# Patient Record
Sex: FEMALE | Race: BLACK OR AFRICAN AMERICAN | NOT HISPANIC OR LATINO | Employment: OTHER | URBAN - METROPOLITAN AREA
[De-identification: names, ages, dates, MRNs, and addresses within clinical notes are randomized per-mention and may not be internally consistent; named-entity substitution may affect disease eponyms.]

---

## 2017-04-11 ENCOUNTER — OFFICE VISIT (OUTPATIENT)
Dept: URGENT CARE | Facility: PHYSICIAN GROUP | Age: 82
End: 2017-04-11
Payer: COMMERCIAL

## 2017-04-11 VITALS
HEIGHT: 61 IN | RESPIRATION RATE: 16 BRPM | OXYGEN SATURATION: 92 % | DIASTOLIC BLOOD PRESSURE: 82 MMHG | TEMPERATURE: 99 F | HEART RATE: 80 BPM | SYSTOLIC BLOOD PRESSURE: 170 MMHG | WEIGHT: 91 LBS | BODY MASS INDEX: 17.18 KG/M2

## 2017-04-11 DIAGNOSIS — H60.501 ACUTE OTITIS EXTERNA OF RIGHT EAR, UNSPECIFIED TYPE: ICD-10-CM

## 2017-04-11 DIAGNOSIS — I10 EPISODE OF HYPERTENSION: ICD-10-CM

## 2017-04-11 PROCEDURE — 99214 OFFICE O/P EST MOD 30 MIN: CPT | Performed by: NURSE PRACTITIONER

## 2017-04-13 NOTE — PROGRESS NOTES
Chief Complaint   Patient presents with   • Otalgia     bilat ear pain x 1 wk       HISTORY OF PRESENT ILLNESS: Patient is a 87 y.o. female who presents today due to complaints of bilateral ear fullness for the past week and right ear pain today. She admits to a history of cerumen impaction. She attempted to irrigate her right ear out yesterday without success and now complains of right ear pain. Denies fever, chills, nausea, headache, injury or trauma. She denies history of hypertension but does admit to recent home stressors. She denies CP, SOB, diaphoresis, weakness, numbness or tingling.     Patient Active Problem List    Diagnosis Date Noted   • Increased urinary frequency 11/16/2016   • Fullness in ear 11/16/2016   • Mild intermittent asthma without complication 11/16/2016   • Need for prophylactic vaccination with Streptococcus pneumoniae (Pneumococcus) and Influenza vaccines 11/16/2016       Allergies:Review of patient's allergies indicates no known allergies.    Current Outpatient Prescriptions Ordered in [facility]   Medication Sig Dispense Refill   • ciprofloxacin (CIPRO HC OTIC) 0.2-1 % Suspension Place 3 Drops in right ear 2 times a day for 7 days. 1 Bottle 0   • omeprazole (PRILOSEC) 20 MG delayed-release capsule Take 20 mg by mouth every day.     • Ginkgo Biloba 40 MG Tab Take 60 mg by mouth.     • albuterol (PROAIR HFA) 108 (90 BASE) MCG/ACT Aero Soln inhalation aerosol Inhale 2 Puffs by mouth every 6 hours as needed for Shortness of Breath. 8.5 g 3     No current [facility]-ordered facility-administered medications on file.       Past Medical History   Diagnosis Date   • Asthma        Social History   Substance Use Topics   • Smoking status: Never Smoker    • Smokeless tobacco: Never Used   • Alcohol Use: No       No family status information on file.   History reviewed. No pertinent family history.    ROS:  Review of Systems   Constitutional: Negative for fever, chills, weight loss, malaise, and fatigue.  "  HENT: Positive for right ear pain and bilateral ear fullness. Negative for nosebleeds, congestion, sore throat and neck pain.    Eyes: Negative for vision changes.   Neuro: Negative for headache, sensory changes, weakness, seizure, LOC.   Cardiovascular: Negative for chest pain, palpitations, orthopnea and leg swelling.   Respiratory: Negative for cough, sputum production, shortness of breath and wheezing.   Gastrointestinal: Negative for abdominal pain, nausea, vomiting or diarrhea.   Musculoskeletal: Negative for falls, neck pain, back pain, joint pain, myalgias.   Skin: Negative for rash, diaphoresis.     Exam:  Blood pressure 170/82, pulse 80, temperature 37.2 °C (99 °F), resp. rate 16, height 1.549 m (5' 1\"), weight 41.277 kg (91 lb), SpO2 92 %.  General: well-nourished, well-developed female in NAD  Head: normocephalic, atraumatic  Eyes: PERRLA, no conjunctival injection, acuity grossly intact, lids normal.  Ears: normal shape and symmetry, no tenderness, no discharge. Left external canal is without any significant edema or erythema, there is cerumen impacted and am unable to visualize tympanic membrane. Right canal is edematous, erythematous, with serosanguineous fluid present.   Nose: symmetrical without tenderness, no discharge.  Mouth/Throat: reasonable hygiene, no erythema, exudates or tonsillar enlargement.  Neck: no masses, range of motion within normal limits, no tracheal deviation. No obvious thyroid enlargement.   Lymph: no cervical adenopathy. No supraclavicular adenopathy.   Neuro: alert and oriented. Cranial nerves 1-12 grossly intact. No sensory deficit.   Cardiovascular: regular rate and rhythm. No edema.  Pulmonary: no distress. Chest is symmetrical with respiration, no wheezes, crackles, or rhonchi.   Musculoskeletal: no clubbing, appropriate muscle tone, gait is stable.  Skin: warm, dry, intact, no clubbing, no cyanosis, no rashes.   Psych: appropriate mood, affect, judgement. "         Assessment/Plan:  1. Acute otitis externa of right ear, unspecified type  ciprofloxacin (CIPRO HC OTIC) 0.2-1 % Suspension   2. Episode of hypertension         I have offered to irrigate the patient's left ear, she is declining at this time, would like her right ear to heal and then will return for re-eval and possible irrigation. Cipro as directed. Keep ear clean and dry, no foreign liquid or items into canal.   Her BP today is elevated, she is given cardiac and stroke warning signs, encouraged to follow up with her PCP ASAP regarding.   Supportive care, differential diagnoses, and indications for immediate follow-up discussed with patient.   Pathogenesis of diagnosis discussed including typical length and natural progression.   Instructed to return to clinic or nearest emergency department for any change in condition, further concerns, or worsening of symptoms.  Patient states understanding of the plan of care and discharge instructions.          Please note that this dictation was created using voice recognition software. I have made every reasonable attempt to correct obvious errors, but I expect that there are errors of grammar and possibly content that I did not discover before finalizing the note.      KRISHNA Muñiz.

## 2017-06-11 ENCOUNTER — OFFICE VISIT (OUTPATIENT)
Dept: URGENT CARE | Facility: PHYSICIAN GROUP | Age: 82
End: 2017-06-11
Payer: COMMERCIAL

## 2017-06-11 VITALS
DIASTOLIC BLOOD PRESSURE: 80 MMHG | BODY MASS INDEX: 17.18 KG/M2 | OXYGEN SATURATION: 94 % | HEART RATE: 76 BPM | WEIGHT: 91 LBS | SYSTOLIC BLOOD PRESSURE: 140 MMHG | TEMPERATURE: 98.2 F | HEIGHT: 61 IN

## 2017-06-11 DIAGNOSIS — S81.811A LACERATION OF LEG, RIGHT, INITIAL ENCOUNTER: ICD-10-CM

## 2017-06-11 PROCEDURE — 12002 RPR S/N/AX/GEN/TRNK2.6-7.5CM: CPT | Performed by: FAMILY MEDICINE

## 2017-06-11 RX ORDER — SOLIFENACIN SUCCINATE 5 MG/1
5 TABLET, FILM COATED ORAL
Refills: 3 | COMMUNITY
Start: 2017-05-15

## 2017-06-11 NOTE — PATIENT INSTRUCTIONS
Laceration Care, Adult  A laceration is a cut that goes through all of the layers of the skin and into the tissue that is right under the skin. Some lacerations heal on their own. Others need to be closed with stitches (sutures), staples, skin adhesive strips, or skin glue. Proper laceration care minimizes the risk of infection and helps the laceration to heal better.  HOW TO CARE FOR YOUR LACERATION  If sutures or staples were used:  · Keep the wound clean and dry.  · If you were given a bandage (dressing), you should change it at least one time per day or as told by your health care provider. You should also change it if it becomes wet or dirty.  · Keep the wound completely dry for the first 24 hours or as told by your health care provider. After that time, you may shower or bathe. However, make sure that the wound is not soaked in water until after the sutures or staples have been removed.  · Clean the wound one time each day or as told by your health care provider:  ¨ Wash the wound with soap and water.  ¨ Rinse the wound with water to remove all soap.  ¨ Pat the wound dry with a clean towel. Do not rub the wound.  · After cleaning the wound, apply a thin layer of antibiotic ointment as told by your health care provider. This will help to prevent infection and keep the dressing from sticking to the wound.  · Have the sutures or staples removed as told by your health care provider.  If skin adhesive strips were used:  · Keep the wound clean and dry.  · If you were given a bandage (dressing), you should change it at least one time per day or as told by your health care provider. You should also change it if it becomes dirty or wet.  · Do not get the skin adhesive strips wet. You may shower or bathe, but be careful to keep the wound dry.  · If the wound gets wet, pat it dry with a clean towel. Do not rub the wound.  · Skin adhesive strips fall off on their own. You may trim the strips as the wound heals. Do not  remove skin adhesive strips that are still stuck to the wound. They will fall off in time.  If skin glue was used:  · Try to keep the wound dry, but you may briefly wet it in the shower or bath. Do not soak the wound in water, such as by swimming.  · After you have showered or bathed, gently pat the wound dry with a clean towel. Do not rub the wound.  · Do not do any activities that will make you sweat heavily until the skin glue has fallen off on its own.  · Do not apply liquid, cream, or ointment medicine to the wound while the skin glue is in place. Using those may loosen the film before the wound has healed.  · If you were given a bandage (dressing), you should change it at least one time per day or as told by your health care provider. You should also change it if it becomes dirty or wet.  · If a dressing is placed over the wound, be careful not to apply tape directly over the skin glue. Doing that may cause the glue to be pulled off before the wound has healed.  · Do not pick at the glue. The skin glue usually remains in place for 5-10 days, then it falls off of the skin.  General Instructions  · Take over-the-counter and prescription medicines only as told by your health care provider.  · If you were prescribed an antibiotic medicine or ointment, take or apply it as told by your doctor. Do not stop using it even if your condition improves.  · To help prevent scarring, make sure to cover your wound with sunscreen whenever you are outside after stitches are removed, after adhesive strips are removed, or when glue remains in place and the wound is healed. Make sure to wear a sunscreen of at least 30 SPF.  · Do not scratch or pick at the wound.  · Keep all follow-up visits as told by your health care provider. This is important.  · Check your wound every day for signs of infection. Watch for:  ¨ Redness, swelling, or pain.  ¨ Fluid, blood, or pus.  · Raise (elevate) the injured area above the level of your heart  while you are sitting or lying down, if possible.  SEEK MEDICAL CARE IF:  · You received a tetanus shot and you have swelling, severe pain, redness, or bleeding at the injection site.  · You have a fever.  · A wound that was closed breaks open.  · You notice a bad smell coming from your wound or your dressing.  · You notice something coming out of the wound, such as wood or glass.  · Your pain is not controlled with medicine.  · You have increased redness, swelling, or pain at the site of your wound.  · You have fluid, blood, or pus coming from your wound.  · You notice a change in the color of your skin near your wound.  · You need to change the dressing frequently due to fluid, blood, or pus draining from the wound.  · You develop a new rash.  · You develop numbness around the wound.  SEEK IMMEDIATE MEDICAL CARE IF:  · You develop severe swelling around the wound.  · Your pain suddenly increases and is severe.  · You develop painful lumps near the wound or on skin that is anywhere on your body.  · You have a red streak going away from your wound.  · The wound is on your hand or foot and you cannot properly move a finger or toe.  · The wound is on your hand or foot and you notice that your fingers or toes look pale or bluish.     This information is not intended to replace advice given to you by your health care provider. Make sure you discuss any questions you have with your health care provider.     Document Released: 12/18/2006 Document Revised: 05/03/2016 Document Reviewed: 12/14/2015  [employer] Patient Education ©2016 [employer]

## 2017-06-11 NOTE — PROGRESS NOTES
"Subjective:      Summer Franco is a 87 y.o. female who presents with Laceration            Laceration   The incident occurred 1 to 3 hours ago. The laceration is located on the right leg. The laceration is 5 cm in size. The laceration mechanism was a blunt object. The pain is mild. She reports no foreign bodies present.       ROS       Objective:     /80 mmHg  Pulse 76  Temp(Src) 36.8 °C (98.2 °F)  Ht 1.549 m (5' 1\")  Wt 41.277 kg (91 lb)  BMI 17.20 kg/m2  SpO2 94%     Physical Exam   Constitutional: She is oriented to person, place, and time. She appears well-developed and well-nourished. No distress.   Eyes: EOM are normal. Pupils are equal, round, and reactive to light.   Cardiovascular: Normal rate, regular rhythm, normal heart sounds and intact distal pulses.    Pulmonary/Chest: Effort normal and breath sounds normal. No respiratory distress.   Abdominal: Soft. Bowel sounds are normal. She exhibits no distension.   Musculoskeletal: Normal range of motion.   Neurological: She is alert and oriented to person, place, and time. She has normal reflexes.   Skin: Skin is warm and dry.        Psychiatric: She has a normal mood and affect. Her behavior is normal.               Assessment/Plan:     1. Laceration of leg, right, initial encounter  PROCEDURE:     The wound area was irrigated with sterile saline and draped in a sterile   fashion.  The wound area was anesthetized with Lidocaine 1% without epinephrine. Once good anaesthesia is achieved, betadine is applied to area .     The wound was repaired with 4-0 Prolene   using simple interrupted stitches and a sterile dressing applied.     Patient to return for suture removal  5-7 days facial  7-10 days all other wounds.    Patient discharged without any immediate complications.  Wound care instructions provided.  OTC analgesics prn  Keep elevated/ice as needed  Worsening/infection precautions given.  Patient states understands agrees with treatment plan " and follow up.

## 2017-06-21 ENCOUNTER — OFFICE VISIT (OUTPATIENT)
Dept: URGENT CARE | Facility: PHYSICIAN GROUP | Age: 82
End: 2017-06-21
Payer: COMMERCIAL

## 2017-06-21 VITALS
OXYGEN SATURATION: 93 % | BODY MASS INDEX: 17.18 KG/M2 | TEMPERATURE: 98.8 F | SYSTOLIC BLOOD PRESSURE: 144 MMHG | WEIGHT: 91 LBS | HEIGHT: 61 IN | HEART RATE: 81 BPM | DIASTOLIC BLOOD PRESSURE: 68 MMHG

## 2017-06-21 DIAGNOSIS — Z48.02 VISIT FOR SUTURE REMOVAL: ICD-10-CM

## 2017-06-21 DIAGNOSIS — L08.9 SOFT TISSUE INFECTION: ICD-10-CM

## 2017-06-21 PROCEDURE — 99203 OFFICE O/P NEW LOW 30 MIN: CPT | Performed by: NURSE PRACTITIONER

## 2017-06-21 RX ORDER — CLINDAMYCIN HYDROCHLORIDE 300 MG/1
300 CAPSULE ORAL 3 TIMES DAILY
Qty: 21 CAP | Refills: 0 | Status: SHIPPED | OUTPATIENT
Start: 2017-06-21 | End: 2017-06-28

## 2017-06-21 NOTE — PROGRESS NOTES
"Subjective:      Summer Franco is a 87 y.o. female who presents with Suture / Staple Removal            Suture / Staple Removal  Treated in ED: 10 days ago. She did not remove the initial bandage placed on day of procedure. She tried nothing since the wound repair. The treatment provided moderate relief. Wound drainage status: When the bandage was removed in the UC she had thick, cloudy bloody drainage from laceration site. The redness has improved. The swelling has improved. The pain has new pain.       Review of Systems   Skin:        Right lower leg lac repair   All other systems reviewed and are negative.    Past Medical History   Diagnosis Date   • Asthma       Past Surgical History   Procedure Laterality Date   • Abdominal hysterectomy total        Social History     Social History   • Marital Status:      Spouse Name: N/A   • Number of Children: N/A   • Years of Education: N/A     Occupational History   • Not on file.     Social History Main Topics   • Smoking status: Never Smoker    • Smokeless tobacco: Never Used   • Alcohol Use: No   • Drug Use: No   • Sexual Activity: Not on file     Other Topics Concern   • Not on file     Social History Narrative          Objective:     /68 mmHg  Pulse 81  Temp(Src) 37.1 °C (98.8 °F)  Ht 1.549 m (5' 1\")  Wt 41.277 kg (91 lb)  BMI 17.20 kg/m2  SpO2 93%     Physical Exam   Constitutional: She is oriented to person, place, and time. Vital signs are normal. She appears well-developed and well-nourished.   HENT:   Head: Normocephalic and atraumatic.   Eyes: EOM are normal. Pupils are equal, round, and reactive to light.   Neck: Normal range of motion.   Cardiovascular: Normal rate and regular rhythm.    Pulmonary/Chest: Effort normal.   Musculoskeletal: Normal range of motion.        Right lower leg: She exhibits tenderness and laceration.        Legs:  Neurological: She is alert and oriented to person, place, and time.   Skin: Skin is warm and dry. "   Psychiatric: She has a normal mood and affect. Her speech is normal and behavior is normal. Thought content normal.   Vitals reviewed.              Assessment/Plan:     1. Visit for suture removal    2. Soft tissue infection  - clindamycin (CLEOCIN) 300 MG Cap; Take 1 Cap by mouth 3 times a day for 7 days.  Dispense: 21 Cap; Refill: 0    6 sutures removed  Steri strips applied for reinforcement  Telfa bandage secured with paper tape  Advised pt to change dressing daily and leave wound open to air as much as possible, she V/U  Instructed to return to  or nearest emergency department if symptoms fail to improve, for any change in condition, further concerns, or new concerning symptoms.  Patient states understanding of the plan of care and discharge instructions.

## 2017-07-03 ENCOUNTER — HOSPITAL ENCOUNTER (OUTPATIENT)
Facility: MEDICAL CENTER | Age: 82
End: 2017-07-03
Attending: PHYSICIAN ASSISTANT
Payer: COMMERCIAL

## 2017-07-03 ENCOUNTER — OFFICE VISIT (OUTPATIENT)
Dept: URGENT CARE | Facility: PHYSICIAN GROUP | Age: 82
End: 2017-07-03
Payer: COMMERCIAL

## 2017-07-03 VITALS
SYSTOLIC BLOOD PRESSURE: 136 MMHG | BODY MASS INDEX: 17.2 KG/M2 | TEMPERATURE: 98.9 F | HEART RATE: 70 BPM | OXYGEN SATURATION: 93 % | WEIGHT: 91 LBS | DIASTOLIC BLOOD PRESSURE: 78 MMHG

## 2017-07-03 DIAGNOSIS — L03.115 CELLULITIS OF RIGHT LOWER EXTREMITY: ICD-10-CM

## 2017-07-03 DIAGNOSIS — L03.90 WOUND CELLULITIS: ICD-10-CM

## 2017-07-03 PROCEDURE — 87205 SMEAR GRAM STAIN: CPT

## 2017-07-03 PROCEDURE — 99214 OFFICE O/P EST MOD 30 MIN: CPT | Performed by: PHYSICIAN ASSISTANT

## 2017-07-03 PROCEDURE — 87070 CULTURE OTHR SPECIMN AEROBIC: CPT

## 2017-07-03 PROCEDURE — 87075 CULTR BACTERIA EXCEPT BLOOD: CPT

## 2017-07-03 RX ORDER — CEPHALEXIN 500 MG/1
500 CAPSULE ORAL 3 TIMES DAILY
Qty: 30 CAP | Refills: 0 | Status: SHIPPED | OUTPATIENT
Start: 2017-07-03 | End: 2017-07-13

## 2017-07-03 RX ORDER — CEFTRIAXONE 1 G/1
1 INJECTION, POWDER, FOR SOLUTION INTRAMUSCULAR; INTRAVENOUS ONCE
Status: COMPLETED | OUTPATIENT
Start: 2017-07-03 | End: 2017-07-03

## 2017-07-03 RX ADMIN — CEFTRIAXONE 1 G: 1 INJECTION, POWDER, FOR SOLUTION INTRAMUSCULAR; INTRAVENOUS at 17:18

## 2017-07-03 ASSESSMENT — ENCOUNTER SYMPTOMS
DIZZINESS: 0
EYE REDNESS: 0
MYALGIAS: 0
SORE THROAT: 0
DIARRHEA: 0
NECK PAIN: 0
ABDOMINAL PAIN: 0
NUMBNESS: 0
CHILLS: 0
VOMITING: 0
TINGLING: 0
FATIGUE: 0
COUGH: 0
WHEEZING: 0
FALLS: 1
VISUAL CHANGE: 0
EYE DISCHARGE: 0
HEADACHES: 0
FEVER: 0

## 2017-07-04 LAB
GRAM STN SPEC: NORMAL
SIGNIFICANT IND 70042: NORMAL
SITE SITE: NORMAL
SOURCE SOURCE: NORMAL

## 2017-07-04 NOTE — PROGRESS NOTES
Subjective:      Summer Franco is a 87 y.o. female who presents with Wound Infection          Pt is 88 y/o female who presents with recurrent wound infection since falling requiring wound repair with 6 sutures in the ER. Pt. Came to . 2 prior visits for same wound- was recently started on Clindamycin of which she reports that she took and is uncertain if this helped. She admits that she has been changing her dressing everyday and then started to have redness around the wound then the wound started to get a little worse. She reports swelling and pain near the wound as well- this started last week. She denies any calf tenderness or increase warmth or pain.   Of note patient denies any renal insufficiency nor DMII.   Wound Infection  This is a recurrent problem. Episode onset: 6/11/17. The problem occurs constantly. Pertinent negatives include no abdominal pain, chest pain, chills, congestion, coughing, fatigue, fever, headaches, myalgias, neck pain, numbness, rash, sore throat, visual change or vomiting. Nothing aggravates the symptoms. Treatments tried: Clindamycin.       Review of Systems   Constitutional: Negative for fever, chills, malaise/fatigue and fatigue.   HENT: Negative for congestion and sore throat.    Eyes: Negative for discharge and redness.   Respiratory: Negative for cough and wheezing.    Cardiovascular: Positive for leg swelling. Negative for chest pain.   Gastrointestinal: Negative for vomiting, abdominal pain and diarrhea.   Musculoskeletal: Positive for falls. Negative for myalgias and neck pain.   Skin: Negative for itching and rash.   Neurological: Negative for dizziness, tingling, numbness and headaches.          Objective:     /78 mmHg  Pulse 70  Temp(Src) 37.2 °C (98.9 °F)  Wt 41.277 kg (91 lb)  SpO2 93%   PMH:  has a past medical history of Asthma.  MEDS:   Current outpatient prescriptions:   •  cephALEXin (KEFLEX) 500 MG Cap, Take 1 Cap by mouth 3 times a day for 10 days.,  Disp: 30 Cap, Rfl: 0  •  VESICARE 5 MG tablet, Take 5 mg by mouth every day., Disp: , Rfl: 3  •  Multiple Vitamin (MULTI VITAMIN PO), Take  by mouth., Disp: , Rfl:   •  omeprazole (PRILOSEC) 20 MG delayed-release capsule, Take 20 mg by mouth every day., Disp: , Rfl:   •  Ginkgo Biloba 40 MG Tab, Take 60 mg by mouth., Disp: , Rfl:   •  albuterol (PROAIR HFA) 108 (90 BASE) MCG/ACT Aero Soln inhalation aerosol, Inhale 2 Puffs by mouth every 6 hours as needed for Shortness of Breath., Disp: 8.5 g, Rfl: 3  ALLERGIES: No Known Allergies  SURGHX:   Past Surgical History   Procedure Laterality Date   • Abdominal hysterectomy total       SOCHX:  reports that she has never smoked. She has never used smokeless tobacco. She reports that she does not drink alcohol or use illicit drugs.  FH: Family history was reviewed, no pertinent findings to report    Physical Exam   Constitutional: She is oriented to person, place, and time. She appears well-developed and well-nourished.   HENT:   Head: Normocephalic and atraumatic.   Nose: Nose normal.   Eyes: EOM are normal. Pupils are equal, round, and reactive to light.   Neck: Normal range of motion. Neck supple.   Cardiovascular: Normal rate and regular rhythm.    Pulmonary/Chest: Effort normal. No respiratory distress.   Musculoskeletal: She exhibits edema. She exhibits no tenderness.   Right lower extremity- anterior portion of leg- open partial thickness wound- approx. 4.5 cm- minimal drainage- clear- this was cultured with surrounding erythema and edema- tenderness with deep palpation. Area of erythema was with increased warmth. This was marked. Neg. Calf tenderness, neg. Cords, and neg. Homans. Without current abscess formation.    Neurological: She is alert and oriented to person, place, and time. Coordination normal.   Skin: No rash noted. There is erythema. No pallor.   As above.    Psychiatric: She has a normal mood and affect. Her behavior is normal.   Vitals reviewed.               Assessment/Plan:     1. Cellulitis of right lower extremity  - cefTRIAXone (ROCEPHIN) injection 1 g; 1,000 mg by Intramuscular route Once.  - ANAEROBIC/AEROBIC/GRAM STAIN  - REFERRAL TO WOUND CLINIC    2. Wound cellulitis  - cefTRIAXone (ROCEPHIN) injection 1 g; 1,000 mg by Intramuscular route Once.  - ANAEROBIC/AEROBIC/GRAM STAIN  - REFERRAL TO WOUND CLINIC  - cephALEXin (KEFLEX) 500 MG Cap; Take 1 Cap by mouth 3 times a day for 10 days.  Dispense: 30 Cap; Refill: 0    At this time patient denies any renal insufficiency. Will change ABX if needed based on culture. Referral to wound care was also made- however RTC in 2 days for recheck- and if improving will then defer to wound care for further management. PT and her  both understand the plan. PT. Is to keep the wound covered if drainage and elevated. Other wound care discussed.   Patient given precautionary s/sx that mandate immediate follow up and evaluation in the ED. Advised of risks of not doing so.    DDX, Supportive care, and indications for immediate follow-up discussed with patient.    Instructed to return to clinic or nearest emergency department if we are not available for any change in condition, further concerns, or worsening of symptoms.    The patient demonstrated a good understanding and agreed with the treatment plan.

## 2017-07-05 ENCOUNTER — OFFICE VISIT (OUTPATIENT)
Dept: URGENT CARE | Facility: PHYSICIAN GROUP | Age: 82
End: 2017-07-05
Payer: COMMERCIAL

## 2017-07-05 VITALS
DIASTOLIC BLOOD PRESSURE: 72 MMHG | TEMPERATURE: 98.1 F | HEIGHT: 61 IN | WEIGHT: 91 LBS | HEART RATE: 79 BPM | BODY MASS INDEX: 17.18 KG/M2 | SYSTOLIC BLOOD PRESSURE: 138 MMHG | OXYGEN SATURATION: 94 %

## 2017-07-05 DIAGNOSIS — L03.90 WOUND CELLULITIS: ICD-10-CM

## 2017-07-05 PROCEDURE — 99024 POSTOP FOLLOW-UP VISIT: CPT | Performed by: NURSE PRACTITIONER

## 2017-07-05 ASSESSMENT — ENCOUNTER SYMPTOMS: FEVER: 0

## 2017-07-05 NOTE — PROGRESS NOTES
"Subjective:      Summer Franco is a 87 y.o. female who presents with Wound Check            Wound Check  Treated in ED: she was seen 2 days ago for a persistant right lower leg infection. She was started on new regimen of ABX but she reports she just picked up the Rx today and has not started it yet. Wound continues to drain onto her dressing. Previous treatment included IV/IM antibiotics and oral antibiotics. Maximum temperature: denies any fever or chills. There has been colored discharge from the wound. The redness has not changed. The swelling has not changed. The pain has not changed. Difficulty Moving Extremity/Digit: Summer reports the right leg is very tender when touched. She has not changed her dressing in two days.       Review of Systems   Constitutional: Negative for fever.   Skin:        RLE wound redness and drainage    All other systems reviewed and are negative.    Past Medical History   Diagnosis Date   • Asthma       Past Surgical History   Procedure Laterality Date   • Abdominal hysterectomy total        Social History     Social History   • Marital Status:      Spouse Name: N/A   • Number of Children: N/A   • Years of Education: N/A     Occupational History   • Not on file.     Social History Main Topics   • Smoking status: Never Smoker    • Smokeless tobacco: Never Used   • Alcohol Use: No   • Drug Use: No   • Sexual Activity: Not on file     Other Topics Concern   • Not on file     Social History Narrative          Objective:     /72 mmHg  Pulse 79  Temp(Src) 36.7 °C (98.1 °F)  Ht 1.549 m (5' 1\")  Wt 41.277 kg (91 lb)  BMI 17.20 kg/m2  SpO2 94%     Physical Exam   Constitutional: She is oriented to person, place, and time. Vital signs are normal. She appears well-developed and well-nourished.   HENT:   Head: Normocephalic and atraumatic.   Eyes: EOM are normal. Pupils are equal, round, and reactive to light.   Neck: Normal range of motion.   Cardiovascular: Normal rate " and regular rhythm.    Pulmonary/Chest: Effort normal.   Musculoskeletal:        Legs:  Neurological: She is alert and oriented to person, place, and time.   Skin: Skin is warm and dry.   Psychiatric: She has a normal mood and affect. Her speech is normal and behavior is normal. Thought content normal.   Vitals reviewed.              Assessment/Plan:     1. Wound cellulitis  Surrounding erythema does not extend beyond the borders of the marker outlined by previous provider. Border reinforced today.   Telfa gauze applied and secured with ace bandage  Instructed pt to change bandage at least once daily  Start Keflex today  RTC in 2 days for wound check  Referral was placed for wound clinic 2 days ago. Referral approved, informed pt that she should be hearing from the referrals dept shortly to make an appt.  Strict ER precautions for worsening condition  Instructed to return to  or nearest emergency department if symptoms fail to improve, for any change in condition, further concerns, or new concerning symptoms.  Patient states understanding of the plan of care and discharge instructions.

## 2017-07-05 NOTE — MR AVS SNAPSHOT
"Summer Franco   2017 12:40 PM   Office Visit   MRN: [id number]    Department:  [facility]   Dept Phone:  [phone]    Description:  Female : 1930   Provider:  CHARMAINE La           Reason for Visit     Wound Check right lower leg       Allergies as of 2017     No Known Allergies      You were diagnosed with     Wound cellulitis   [176178]         Vital Signs     Blood Pressure Pulse Temperature Height Weight Body Mass Index    138/72 mmHg 79 36.7 °C (98.1 °F) 1.549 m (5' 1\") 41.277 kg (91 lb) 17.20 kg/m2    Oxygen Saturation Smoking Status                94% Never Smoker           Basic Information     Date Of Birth Sex Race Ethnicity Preferred Language    1930 Female Black or  Non- English      Problem List              ICD-10-CM Priority Class Noted - Resolved    Increased urinary frequency R35.0   2016 - Present    Fullness in ear H93.8X9   2016 - Present    Mild intermittent asthma without complication J45.20   2016 - Present    Need for prophylactic vaccination with Streptococcus pneumoniae (Pneumococcus) and Influenza vaccines Z23   2016 - Present      Health Maintenance        Date Due Completion Dates    IMM DTaP/Tdap/Td Vaccine (1 - Tdap) 1949 ---    IMM ZOSTER VACCINE 1990 ---    BONE DENSITY 1995 ---    IMM INFLUENZA (1) 2017    IMM PNEUMOCOCCAL 65+ (ADULT) LOW/MEDIUM RISK SERIES (2 of 2 - PPSV23) 2017            Current Immunizations     13-VALENT PCV PREVNAR 2016    Influenza Vaccine Adult HD 2016      Below and/or attached are the medications your provider expects you to take. Review all of your home medications and newly ordered medications with your provider and/or pharmacist. Follow medication instructions as directed by your provider and/or pharmacist. Please keep your medication list with you and share with your provider. Update the information " when medications are discontinued, doses are changed, or new medications (including over-the-counter products) are added; and carry medication information at all times in the event of emergency situations     Allergies:  No Known Allergies          Medications  Valid as of: July 05, 2017 -  5:47 PM    Generic Name Brand Name Tablet Size Instructions for use    Albuterol Sulfate (Aero Soln) albuterol 108 (90 BASE) MCG/ACT Inhale 2 Puffs by mouth every 6 hours as needed for Shortness of Breath.        Cephalexin (Cap) KEFLEX 500 MG Take 1 Cap by mouth 3 times a day for 10 days.        Ginkgo Biloba (Tab) Ginkgo Biloba 40 MG Take 60 mg by mouth.        Multiple Vitamin   Take  by mouth.        Omeprazole (CAPSULE DELAYED RELEASE) PRILOSEC 20 MG Take 20 mg by mouth every day.        Solifenacin Succinate (Tab) VESICARE 5 MG Take 5 mg by mouth every day.        .                 Medicines prescribed today were sent to:     [id number] - [place], [place] - [address]    [address] [address]    Phone: [phone] Fax: [phone]    Open 24 Hours?: No      Medication refill instructions:       If your prescription bottle indicates you have medication refills left, it is not necessary to call your provider’s office. Please contact your pharmacy and they will refill your medication.    If your prescription bottle indicates you do not have any refills left, you may request refills at any time through one of the following ways: The online [employer] system (except Urgent Care), by calling your provider’s office, or by asking your pharmacy to contact your provider’s office with a refill request. Medication refills are processed only during regular business hours and may not be available until the next business day. Your provider may request additional information or to have a follow-up visit with you prior to refilling your medication.   *Please Note: Medication refills are assigned a new Rx number when refilled  electronically. Your pharmacy may indicate that no refills were authorized even though a new prescription for the same medication is available at the pharmacy. Please request the medicine by name with the pharmacy before contacting your provider for a refill.           Binpress Access Code: [email or web address]  Expires: 7/27/2017  4:08 AM    Binpress  A secure, online tool to manage your health information     [employer]’s Binpress® is a secure, online tool that connects you to your personalized health information from the privacy of your home -- day or night - making it very easy for you to manage your healthcare. Once the activation process is completed, you can even access your medical information using the Binpress tom, which is available for free in the Apple Tom store or Google Play store.     Binpress provides the following levels of access (as shown below):   My Chart Features   [facility] Doctor [facility]  Specialists [facility]  Urgent  Care Non-[facility]  Primary Care  Doctor   Email your healthcare team securely and privately 24/7 X X X    Manage appointments: schedule your next appointment; view details of past/upcoming appointments X      Request prescription refills. X      View recent personal medical records, including lab and immunizations X X X X   View health record, including health history, allergies, medications X X X X   Read reports about your outpatient visits, procedures, consult and ER notes X X X X   See your discharge summary, which is a recap of your hospital and/or ER visit that includes your diagnosis, lab results, and care plan. X X       How to register for Binpress:  1. Go to  [email or web address].  2. Click on the Sign Up Now box, which takes you to the New Member Sign Up page. You will need to provide the following information:  a. Enter your Binpress Access Code exactly as it appears at the top of this page. (You will not need to use this code after you’ve completed the sign-up  process. If you do not sign up before the expiration date, you must request a new code.)   b. Enter your date of birth.   c. Enter your home email address.   d. Click Submit, and follow the next screen’s instructions.  3. Create a twenty5mediat ID. This will be your [employer]mediat login ID and cannot be changed, so think of one that is secure and easy to remember.  4. Create a [employer] password. You can change your password at any time.  5. Enter your Password Reset Question and Answer. This can be used at a later time if you forget your password.   6. Enter your e-mail address. This allows you to receive e-mail notifications when new information is available in [employer].  7. Click Sign Up. You can now view your health information.    For assistance activating your [employer] account, call [phone]

## 2017-07-06 LAB
BACTERIA WND AEROBE CULT: ABNORMAL
BACTERIA WND AEROBE CULT: ABNORMAL
GRAM STN SPEC: ABNORMAL
SIGNIFICANT IND 70042: ABNORMAL
SITE SITE: ABNORMAL
SOURCE SOURCE: ABNORMAL

## 2017-07-07 LAB
BACTERIA SPEC ANAEROBE CULT: NORMAL
SIGNIFICANT IND 70042: NORMAL
SITE SITE: NORMAL
SOURCE SOURCE: NORMAL

## 2017-07-20 ENCOUNTER — NON-PROVIDER VISIT (OUTPATIENT)
Dept: WOUND CARE | Facility: MEDICAL CENTER | Age: 82
End: 2017-07-20
Attending: PHYSICIAN ASSISTANT
Payer: COMMERCIAL

## 2017-07-20 PROCEDURE — A6212 FOAM DRG <=16 SQ IN W/BORDER: HCPCS

## 2017-07-20 PROCEDURE — 97602 WOUND(S) CARE NON-SELECTIVE: CPT

## 2017-07-20 PROCEDURE — A6402 STERILE GAUZE <= 16 SQ IN: HCPCS
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
## 2017-07-26 ENCOUNTER — OFFICE VISIT (OUTPATIENT)
Dept: WOUND CARE | Facility: MEDICAL CENTER | Age: 82
End: 2017-07-26
Attending: PHYSICIAN ASSISTANT
Payer: COMMERCIAL

## 2017-07-26 DIAGNOSIS — M79.89 SWELLING OF LOWER EXTREMITY: ICD-10-CM

## 2017-07-26 DIAGNOSIS — S81.801S WOUND OF RIGHT LOWER EXTREMITY, SEQUELA: ICD-10-CM

## 2017-07-26 PROBLEM — S81.801A WOUND OF RIGHT LOWER EXTREMITY: Status: ACTIVE | Noted: 2017-07-26

## 2017-07-26 PROCEDURE — 15271 SKIN SUB GRAFT TRNK/ARM/LEG: CPT | Performed by: FAMILY MEDICINE

## 2017-07-26 PROCEDURE — 99213 OFFICE O/P EST LOW 20 MIN: CPT | Mod: 25 | Performed by: FAMILY MEDICINE

## 2017-07-26 PROCEDURE — 11042 DBRDMT SUBQ TIS 1ST 20SQCM/<: CPT

## 2017-07-26 ASSESSMENT — ENCOUNTER SYMPTOMS
DEPRESSION: 0
FALLS: 1
WEAKNESS: 1
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
## 2017-07-26 NOTE — PROGRESS NOTES
Subjective:     HPI   [address]  [address]    [place] [address]    [phone] Fax: [phone]     New Wound Evaluation      Referring Physician: Alfonso Khan PA-C  Primary Physician:   Lissette Morales     Consulting Physicians:         Wound(s):R anterolateral LE      Start of Care: 07/20/2017    Pt seen in collaboration with Yang Pastrana RN.    Pt is a 87 year old female who presents with a right lower extremity wound.    She states that on June 11 she fell and hit her leg on some boxes.    Went to  and had stitches placed for 10 days before they were removed.  No antibiotics.    The wound still has not resolved and she presented last week to initiate wound care.  She states the wound is very tender.  A tubigrip was put on at her last appointment but   she took it off because it was too tight.  She has also been taking off the wound dressing   daily to put ointment on it.    Past Medical History   Diagnosis Date   • Asthma      Past Surgical History   Procedure Laterality Date   • Abdominal hysterectomy total       Current Outpatient Prescriptions on File Prior to Visit   Medication Sig Dispense Refill   • VESICARE 5 MG tablet Take 5 mg by mouth every day.  3   • Multiple Vitamin (MULTI VITAMIN PO) Take  by mouth.     • omeprazole (PRILOSEC) 20 MG delayed-release capsule Take 20 mg by mouth every day.     • Ginkgo Biloba 40 MG Tab Take 60 mg by mouth.     • albuterol (PROAIR HFA) 108 (90 BASE) MCG/ACT Aero Soln inhalation aerosol Inhale 2 Puffs by mouth every 6 hours as needed for Shortness of Breath. 8.5 g 3     No current facility-administered medications on file prior to visit.     Review of patient's allergies indicates no known allergies.    Social History     Social History   • Marital Status:      Spouse Name: N/A   • Number of Children: N/A   • Years of Education: N/A     Occupational History   • Not on file.     Social History Main Topics   • Smoking status: Never Smoker    •  Smokeless tobacco: Never Used   • Alcohol Use: No   • Drug Use: No   • Sexual Activity: Not on file     Other Topics Concern   • Not on file     Social History Narrative   Lives at home with  who has dementia.    Her daughter lives nearby and is available to provide assistance if needed.    Review of Systems   HENT: Positive for hearing loss.    Musculoskeletal: Positive for falls.   Neurological: Positive for weakness.   Psychiatric/Behavioral: Negative for depression.     Fall Risk Assessment (vic all that apply with an X):   65 years or older  x   Fall within the last 2 years, uses x  Ambulatory devices  Loss of protective sensation in feet,   Use of prostethic/orthotic, years    Presence of lower extremity/foot/toe amputation   Taking medication that increases risk (per facility policy)    Interventions Recommended (if any of the above are selected):   Use of Assistive Device:_   Supervision with ambulation:  Caregiver   Assistance with ambulation:  Caregiver   Home safety education:  Educational material provided     Objective:     Physical Exam   Constitutional: She is oriented to person, place, and time. She appears well-developed and well-nourished.   HENT:   Head: Normocephalic and atraumatic.   Pulmonary/Chest: Effort normal.   Musculoskeletal:        Legs:  Neurological: She is alert and oriented to person, place, and time.   Skin: There is erythema.   Psychiatric: She has a normal mood and affect.       DESCRIPTION OF PROCEDURE: Selective debridement  ANESTHESIA: 3 ml 1% Xyloocaine with epinephrine    DESCRIPTION OF PROCEDURE: Consents signed. Skin around the wound was prepped with betadine.    Xyloocaine with epinephrine injected subcutaneously into 4 sites surrounding the wound.   Using a curette yellow adherent slough was debrided from the wound bed into the subqutaneous tissue.    The wound edges were also excised as well.  Approximately 3.5cm2 of tissue was excised.    Bleeding controlled  with manual pressure.      Patient tolerated the procedure well.  Post care instructions were given to her and her dajuwanher.    They were advised to seek medical attention if she develops fever, chills, or tremendous pain at the wound site.    Wound dressing completed by Yang.  Assessment/Plan:     1. RLE wound- wound debrided today.  Adivsed pt that she should not be changing   dressings or putting anything on the wound.  Larger size tubigrip placed today to aide with compression.    2. LE swelling- due to trauma.  Tubigrip placed to aide with compression.    3. Falls- situational.  Fall risks discussed with patient.    30 minutes spent with pt.

## 2017-07-28 ENCOUNTER — NON-PROVIDER VISIT (OUTPATIENT)
Dept: WOUND CARE | Facility: MEDICAL CENTER | Age: 82
End: 2017-07-28
Attending: PHYSICIAN ASSISTANT
Payer: COMMERCIAL

## 2017-07-28 PROCEDURE — 302717 HCHG ABSORBANT-ANTI MICROBIAL 7 DAY

## 2017-07-28 PROCEDURE — A6402 STERILE GAUZE <= 16 SQ IN: HCPCS

## 2017-07-28 PROCEDURE — A6212 FOAM DRG <=16 SQ IN W/BORDER: HCPCS

## 2017-07-28 PROCEDURE — 97602 WOUND(S) CARE NON-SELECTIVE: CPT
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
## 2017-08-01 ENCOUNTER — NON-PROVIDER VISIT (OUTPATIENT)
Dept: WOUND CARE | Facility: MEDICAL CENTER | Age: 82
End: 2017-08-01
Attending: PHYSICIAN ASSISTANT
Payer: COMMERCIAL

## 2017-08-01 ENCOUNTER — HOSPITAL ENCOUNTER (OUTPATIENT)
Facility: MEDICAL CENTER | Age: 82
End: 2017-08-01
Attending: FAMILY MEDICINE
Payer: COMMERCIAL

## 2017-08-01 DIAGNOSIS — B99.9 INFECTION: ICD-10-CM

## 2017-08-01 PROBLEM — L53.9 ERYTHEMA OF SKIN: Status: ACTIVE | Noted: 2017-08-01

## 2017-08-01 PROCEDURE — 87070 CULTURE OTHR SPECIMN AEROBIC: CPT

## 2017-08-01 PROCEDURE — A6212 FOAM DRG <=16 SQ IN W/BORDER: HCPCS

## 2017-08-01 PROCEDURE — 97597 DBRDMT OPN WND 1ST 20 CM/<: CPT

## 2017-08-01 PROCEDURE — A6402 STERILE GAUZE <= 16 SQ IN: HCPCS

## 2017-08-01 PROCEDURE — 87205 SMEAR GRAM STAIN: CPT
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
## 2017-08-02 LAB
GRAM STN SPEC: NORMAL
SIGNIFICANT IND 70042: NORMAL
SITE SITE: NORMAL
SOURCE SOURCE: NORMAL

## 2017-08-03 LAB
BACTERIA WND AEROBE CULT: NORMAL
GRAM STN SPEC: NORMAL
SIGNIFICANT IND 70042: NORMAL
SITE SITE: NORMAL
SOURCE SOURCE: NORMAL

## 2017-08-04 ENCOUNTER — NON-PROVIDER VISIT (OUTPATIENT)
Dept: WOUND CARE | Facility: MEDICAL CENTER | Age: 82
End: 2017-08-04
Attending: PHYSICIAN ASSISTANT
Payer: COMMERCIAL

## 2017-08-04 PROCEDURE — 97602 WOUND(S) CARE NON-SELECTIVE: CPT

## 2017-08-04 PROCEDURE — 97597 DBRDMT OPN WND 1ST 20 CM/<: CPT
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
## 2017-08-08 ENCOUNTER — NON-PROVIDER VISIT (OUTPATIENT)
Dept: WOUND CARE | Facility: MEDICAL CENTER | Age: 82
End: 2017-08-08
Attending: PHYSICIAN ASSISTANT
Payer: COMMERCIAL

## 2017-08-08 PROCEDURE — 97602 WOUND(S) CARE NON-SELECTIVE: CPT
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
## 2017-08-11 ENCOUNTER — NON-PROVIDER VISIT (OUTPATIENT)
Dept: WOUND CARE | Facility: MEDICAL CENTER | Age: 82
End: 2017-08-11
Attending: PHYSICIAN ASSISTANT
Payer: COMMERCIAL

## 2017-08-11 PROCEDURE — 97602 WOUND(S) CARE NON-SELECTIVE: CPT
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
## 2017-08-16 ENCOUNTER — OFFICE VISIT (OUTPATIENT)
Dept: WOUND CARE | Facility: MEDICAL CENTER | Age: 82
End: 2017-08-16
Attending: PHYSICIAN ASSISTANT
Payer: COMMERCIAL

## 2017-08-16 DIAGNOSIS — S81.801S WOUND OF RIGHT LOWER EXTREMITY, SEQUELA: ICD-10-CM

## 2017-08-16 DIAGNOSIS — M79.89 SWELLING OF LOWER EXTREMITY: ICD-10-CM

## 2017-08-16 DIAGNOSIS — H91.93 HEARING DIFFICULTY OF BOTH EARS: ICD-10-CM

## 2017-08-16 PROCEDURE — 99999 PR NO CHARGE: CPT | Performed by: FAMILY MEDICINE

## 2017-08-16 PROCEDURE — 97602 WOUND(S) CARE NON-SELECTIVE: CPT

## 2017-08-16 PROCEDURE — 11042 DBRDMT SUBQ TIS 1ST 20SQCM/<: CPT | Performed by: FAMILY MEDICINE
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
## 2017-08-16 NOTE — PROGRESS NOTES
30 Day Reassessment      DATE OF CONSULTATION: 8/16/2017    Patient seen in collaboration with wound care clinician,Gretta Beckwith RN.    HISTORY OF PRESENT ILLNESS:  Pt is a 87 year old female who presents with a right lower extremity wound.     She states that on June 11 she fell and hit her leg on some boxes.     Went to  and had stitches placed for 10 days before they were removed. No antibiotics.    Start of Care: 07/20/2017     Frequency: q2/weeks    Wound cultures: Negative    She presents today for treatment and for a 30-day reassessment.  The wound has good granulation tissue.   There is maceration and the pt was advised not to get the wound wet.  Her foot is still swollen but she is unable to tolerate compression so she is wearing an ace bandage.    PAST MEDICAL HISTORY:   Past Medical History   Diagnosis Date   • Asthma        PAST SURGICAL HISTORY:   Past Surgical History   Procedure Laterality Date   • Abdominal hysterectomy total          FAMILY HISTORY: No family history on file.    MEDICATIONS:   Current Outpatient Prescriptions   Medication   • VESICARE 5 MG tablet   • Multiple Vitamin (MULTI VITAMIN PO)   • omeprazole (PRILOSEC) 20 MG delayed-release capsule   • Ginkgo Biloba 40 MG Tab   • albuterol (PROAIR HFA) 108 (90 BASE) MCG/ACT Aero Soln inhalation aerosol     No current facility-administered medications for this visit.       ALLERGIES:  No Known Allergies    SOCIAL HISTORY:   Social History     Social History   • Marital Status:      Spouse Name: N/A   • Number of Children: N/A   • Years of Education: N/A     Social History Main Topics   • Smoking status: Never Smoker    • Smokeless tobacco: Never Used   • Alcohol Use: No   • Drug Use: No   • Sexual Activity: Not on file     Other Topics Concern   • Not on file     Social History Narrative     REVIEW OF SYSTEMS:   ROS    Fall Risk Assessment (vic all that apply with an X):   65 years or older  x   Fall within the last 2 years, uses  x  Ambulatory devices  Loss of protective sensation in feet,   Use of prostethic/orthotic, years    Presence of lower extremity/foot/toe amputation   Taking medication that increases risk (per facility policy)    Interventions Recommended (if any of the above are selected):   Use of Assistive Device: None   Supervision with ambulation:  Caregiver   Assistance with ambulation:  Caregiver   Home safety education:  Educational material provided      PHYSICAL EXAMINATION:     Physical Exam    WOUND PROGRESS:  Wound size: 2.5 x 0.9 x 0.2 (dec. By 0.75cm2)    Using a scalpel, 2.25 cm2 of slough and nonviable tissue was excised   from the wound bed into the subcutaenous layer.   Good granulation tissue was noted at the wound bed.    Slight maceration was also noted at the left border of the wound.    No bleeding noted.    ASSESSMENT AND PLAN:     1. RLE wound- wound debrided today.    Adivsed pt that she should not be getting the wound wet.  Also advised that compression would aide in wound healing potential if she can tolerate it.    2. LE swelling- due to trauma.  Tubigrip placed to aide with wound healing potential.    3. Falls- situational.  Fall risks discussed with patient.    Time spent with patient 30 minutes

## 2017-08-18 ENCOUNTER — NON-PROVIDER VISIT (OUTPATIENT)
Dept: WOUND CARE | Facility: MEDICAL CENTER | Age: 82
End: 2017-08-18
Attending: PHYSICIAN ASSISTANT
Payer: COMMERCIAL

## 2017-08-18 PROCEDURE — 97597 DBRDMT OPN WND 1ST 20 CM/<: CPT
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
## 2017-08-22 ENCOUNTER — NON-PROVIDER VISIT (OUTPATIENT)
Dept: WOUND CARE | Facility: MEDICAL CENTER | Age: 82
End: 2017-08-22
Attending: PHYSICIAN ASSISTANT
Payer: COMMERCIAL

## 2017-08-22 PROCEDURE — 97597 DBRDMT OPN WND 1ST 20 CM/<: CPT
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
## 2017-08-24 ENCOUNTER — NON-PROVIDER VISIT (OUTPATIENT)
Dept: WOUND CARE | Facility: MEDICAL CENTER | Age: 82
End: 2017-08-24
Attending: PHYSICIAN ASSISTANT
Payer: COMMERCIAL

## 2017-08-24 PROCEDURE — 97602 WOUND(S) CARE NON-SELECTIVE: CPT
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
## 2017-08-29 ENCOUNTER — NON-PROVIDER VISIT (OUTPATIENT)
Dept: WOUND CARE | Facility: MEDICAL CENTER | Age: 82
End: 2017-08-29
Attending: PHYSICIAN ASSISTANT
Payer: COMMERCIAL

## 2017-08-29 PROCEDURE — 97602 WOUND(S) CARE NON-SELECTIVE: CPT
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
## 2017-09-01 ENCOUNTER — NON-PROVIDER VISIT (OUTPATIENT)
Dept: WOUND CARE | Facility: MEDICAL CENTER | Age: 82
End: 2017-09-01
Attending: PHYSICIAN ASSISTANT
Payer: COMMERCIAL

## 2017-09-01 PROCEDURE — 97602 WOUND(S) CARE NON-SELECTIVE: CPT
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
## 2017-09-06 ENCOUNTER — NON-PROVIDER VISIT (OUTPATIENT)
Dept: WOUND CARE | Facility: MEDICAL CENTER | Age: 82
End: 2017-09-06
Attending: PHYSICIAN ASSISTANT
Payer: COMMERCIAL

## 2017-09-06 PROCEDURE — 97602 WOUND(S) CARE NON-SELECTIVE: CPT
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
## 2017-09-08 ENCOUNTER — NON-PROVIDER VISIT (OUTPATIENT)
Dept: WOUND CARE | Facility: MEDICAL CENTER | Age: 82
End: 2017-09-08
Attending: PHYSICIAN ASSISTANT
Payer: COMMERCIAL

## 2017-09-08 PROCEDURE — 97602 WOUND(S) CARE NON-SELECTIVE: CPT
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
## 2017-09-13 ENCOUNTER — NON-PROVIDER VISIT (OUTPATIENT)
Dept: WOUND CARE | Facility: MEDICAL CENTER | Age: 82
End: 2017-09-13
Attending: PHYSICIAN ASSISTANT
Payer: COMMERCIAL

## 2017-09-13 PROCEDURE — 99211 OFF/OP EST MAY X REQ PHY/QHP: CPT

## 2017-09-13 NOTE — CERTIFICATION
Advanced Wound Care   [facility]   [address]   [address]   [place] [address]   [phone] Fax: [phone]    Discharge Note      Referring Physician: Alfonso Khan PA-C  Wound Etiology: post trauma  Wound location: R anterolateral LE  L03.115, L03.90  Date of Discharge:    Assessment:  Discharge patient at this time secondary to wound resolution. Discussed being careful with the fragile tissue as it matures and can apply lotion & open to air. If need further care, seek referral from primary to return to [facility].    Thank you for the referral and the opportunity to treat your patient.      Clinician Signature: _____________________________ Date:_______________

## 2017-09-15 ENCOUNTER — APPOINTMENT (OUTPATIENT)
Dept: WOUND CARE | Facility: MEDICAL CENTER | Age: 82
End: 2017-09-15
Attending: PHYSICIAN ASSISTANT
Payer: COMMERCIAL

## 2017-09-20 ENCOUNTER — APPOINTMENT (OUTPATIENT)
Dept: WOUND CARE | Facility: MEDICAL CENTER | Age: 82
End: 2017-09-20
Attending: PHYSICIAN ASSISTANT
Payer: COMMERCIAL

## 2018-01-18 DIAGNOSIS — J45.20 MILD INTERMITTENT ASTHMA WITHOUT COMPLICATION: ICD-10-CM

## 2018-01-19 NOTE — TELEPHONE ENCOUNTER
Requested Prescriptions     Signed Prescriptions Disp Refills   • PROAIR  (90 Base) MCG/ACT Aero Soln inhalation aerosol 1 Inhaler 3     Sig: INHALE 2 PUFFS BY MOUTH EVERY 6 HOURS AS NEEDED FOR FOR SHORTNESS OF BREATH     Authorizing Provider: SCOTT GURROLA A.P.R.N.

## 2018-01-30 ENCOUNTER — OFFICE VISIT (OUTPATIENT)
Dept: URGENT CARE | Facility: PHYSICIAN GROUP | Age: 83
End: 2018-01-30
Payer: COMMERCIAL

## 2018-01-30 VITALS
HEART RATE: 81 BPM | SYSTOLIC BLOOD PRESSURE: 160 MMHG | RESPIRATION RATE: 14 BRPM | HEIGHT: 61 IN | DIASTOLIC BLOOD PRESSURE: 98 MMHG | TEMPERATURE: 99.1 F | WEIGHT: 89 LBS | OXYGEN SATURATION: 95 % | BODY MASS INDEX: 16.8 KG/M2

## 2018-01-30 DIAGNOSIS — H60.313 ACUTE DIFFUSE OTITIS EXTERNA OF BOTH EARS: ICD-10-CM

## 2018-01-30 PROCEDURE — 99214 OFFICE O/P EST MOD 30 MIN: CPT | Performed by: PHYSICIAN ASSISTANT

## 2018-01-30 RX ORDER — CIPROFLOXACIN AND DEXAMETHASONE 3; 1 MG/ML; MG/ML
4 SUSPENSION/ DROPS AURICULAR (OTIC) 2 TIMES DAILY
Qty: 1 BOTTLE | Refills: 0 | Status: SHIPPED | OUTPATIENT
Start: 2018-01-30 | End: 2018-02-06

## 2018-01-30 ASSESSMENT — PAIN SCALES - GENERAL: PAINLEVEL: 9=SEVERE PAIN

## 2018-02-01 ASSESSMENT — ENCOUNTER SYMPTOMS
VOMITING: 0
DIZZINESS: 0
SHORTNESS OF BREATH: 0
SORE THROAT: 0
CHILLS: 0
COUGH: 0
DIARRHEA: 0
FEVER: 0
SPUTUM PRODUCTION: 0
MUSCULOSKELETAL NEGATIVE: 1
ABDOMINAL PAIN: 0
NAUSEA: 0

## 2018-02-01 NOTE — PROGRESS NOTES
"Subjective:      Summer Franco is a 87 y.o. female who presents with Ear Fullness (bilateral, right ear pain, x 1 week)        Patient is accompanied by her daughter.     Ear Fullness   This is a new problem. The current episode started in the past 7 days (1 week). The problem occurs constantly. The problem has been unchanged. Pertinent negatives include no abdominal pain, chest pain, chills, congestion, coughing, fever, nausea, rash, sore throat or vomiting. Nothing aggravates the symptoms. She has tried nothing for the symptoms.     Patient presents to urgent care reporting a 1 week history of bilateral ear fullness, decreased hearing, and bilateral ear pain. She states the left ear is worse than the right. No fevers, chills, body aches, cough, congestions, nausea, or vomiting.     Review of Systems   Constitutional: Negative for chills and fever.   HENT: Positive for ear discharge, ear pain and hearing loss. Negative for congestion, sore throat and tinnitus.    Respiratory: Negative for cough, sputum production and shortness of breath.    Cardiovascular: Negative for chest pain.   Gastrointestinal: Negative for abdominal pain, diarrhea, nausea and vomiting.   Genitourinary: Negative.    Musculoskeletal: Negative.    Skin: Negative for rash.   Neurological: Negative for dizziness.        Objective:     /98   Pulse 81   Temp 37.3 °C (99.1 °F)   Resp 14   Ht 1.549 m (5' 1\")   Wt 40.4 kg (89 lb)   SpO2 95%   BMI 16.82 kg/m²      Physical Exam   Constitutional: She is oriented to person, place, and time. She appears well-developed and well-nourished. No distress.   HENT:   Head: Normocephalic and atraumatic.   Right Ear: Hearing and external ear normal. There is drainage.   Left Ear: Hearing and external ear normal. There is drainage.   Mouth/Throat: Oropharynx is clear and moist. No oropharyngeal exudate.   Bilateral ear canals erythematous with thick, white discharge present, occluding TMs " bilaterally. Possible cerumen impaction noted bilaterally as well.    Eyes: Conjunctivae are normal. Pupils are equal, round, and reactive to light. Right eye exhibits no discharge. Left eye exhibits no discharge.   Neck: Normal range of motion.   Cardiovascular: Normal rate, regular rhythm and normal heart sounds.    No murmur heard.  Pulmonary/Chest: Effort normal and breath sounds normal. No respiratory distress. She has no wheezes. She has no rales.   Musculoskeletal: Normal range of motion.   Lymphadenopathy:     She has no cervical adenopathy.   Neurological: She is alert and oriented to person, place, and time.   Skin: Skin is warm and dry. She is not diaphoretic.   Psychiatric: She has a normal mood and affect. Her behavior is normal.   Nursing note and vitals reviewed.         PMH:  has a past medical history of Asthma.  MEDS:   Current Outpatient Prescriptions:   •  ciprofloxacin/dexamethasone (CIPRODEX) 0.3-0.1 % Suspension, Place 4 Drops in ear 2 times a day for 7 days., Disp: 1 Bottle, Rfl: 0  •  PROAIR  (90 Base) MCG/ACT Aero Soln inhalation aerosol, INHALE 2 PUFFS BY MOUTH EVERY 6 HOURS AS NEEDED FOR FOR SHORTNESS OF BREATH, Disp: 1 Inhaler, Rfl: 3  •  VESICARE 5 MG tablet, Take 5 mg by mouth every day., Disp: , Rfl: 3  •  Multiple Vitamin (MULTI VITAMIN PO), Take  by mouth., Disp: , Rfl:   •  omeprazole (PRILOSEC) 20 MG delayed-release capsule, Take 20 mg by mouth every day., Disp: , Rfl:   •  Ginkgo Biloba 40 MG Tab, Take 60 mg by mouth., Disp: , Rfl:   ALLERGIES: No Known Allergies  SURGHX:   Past Surgical History:   Procedure Laterality Date   • ABDOMINAL HYSTERECTOMY TOTAL       SOCHX:  reports that she has never smoked. She has never used smokeless tobacco. She reports that she does not drink alcohol or use drugs.  FH: family history is not on file.       Assessment/Plan:     1. Acute diffuse otitis externa of both ears  - ciprofloxacin/dexamethasone (CIPRODEX) 0.3-0.1 % Suspension; Place  4 Drops in ear 2 times a day for 7 days.  Dispense: 1 Bottle; Refill: 0    Possible cerumen impaction present at today's visit as well. Patient encouraged to use the antibiotic drops for 1 week. RTC for ear lavage if hearing loss and ear fullness does not resolve. The patient and her daughter demonstrated a good understanding and agreed with the treatment plan.

## 2018-02-07 ENCOUNTER — OFFICE VISIT (OUTPATIENT)
Dept: URGENT CARE | Facility: PHYSICIAN GROUP | Age: 83
End: 2018-02-07
Payer: COMMERCIAL

## 2018-02-07 VITALS
SYSTOLIC BLOOD PRESSURE: 140 MMHG | HEIGHT: 61 IN | RESPIRATION RATE: 14 BRPM | DIASTOLIC BLOOD PRESSURE: 70 MMHG | TEMPERATURE: 97.7 F | OXYGEN SATURATION: 91 % | HEART RATE: 88 BPM | WEIGHT: 89 LBS | BODY MASS INDEX: 16.8 KG/M2

## 2018-02-07 DIAGNOSIS — H61.23 BILATERAL HEARING LOSS DUE TO CERUMEN IMPACTION: ICD-10-CM

## 2018-02-07 PROCEDURE — 99213 OFFICE O/P EST LOW 20 MIN: CPT | Performed by: NURSE PRACTITIONER

## 2018-02-07 ASSESSMENT — ENCOUNTER SYMPTOMS
VOMITING: 0
CHILLS: 0
HEADACHES: 0
FEVER: 0
NAUSEA: 0
DIZZINESS: 0

## 2018-02-07 NOTE — PROGRESS NOTES
"Subjective:      Summer Franco is a 88 y.o. female who presents with Otalgia (left ear pain xx2 weeks)            HPI This is a recurrent problem. 88 year old female returning for check on ears and possible lavage. She was seen a week ago and started on ear drops. Modest improvement in discomfort but still with problems hearing. She denies nausea or dizziness.   Patient has no known allergies.  Current Outpatient Prescriptions on File Prior to Visit   Medication Sig Dispense Refill   • PROAIR  (90 Base) MCG/ACT Aero Soln inhalation aerosol INHALE 2 PUFFS BY MOUTH EVERY 6 HOURS AS NEEDED FOR FOR SHORTNESS OF BREATH 1 Inhaler 3   • VESICARE 5 MG tablet Take 5 mg by mouth every day.  3   • Multiple Vitamin (MULTI VITAMIN PO) Take  by mouth.     • omeprazole (PRILOSEC) 20 MG delayed-release capsule Take 20 mg by mouth every day.     • Ginkgo Biloba 40 MG Tab Take 60 mg by mouth.       No current facility-administered medications on file prior to visit.      Social History     Social History   • Marital status:      Spouse name: N/A   • Number of children: N/A   • Years of education: N/A     Occupational History   • Not on file.     Social History Main Topics   • Smoking status: Never Smoker   • Smokeless tobacco: Never Used   • Alcohol use No   • Drug use: No   • Sexual activity: Not on file     Other Topics Concern   • Not on file     Social History Narrative   • No narrative on file     family history is not on file.      Review of Systems   Constitutional: Negative for chills and fever.   HENT: Positive for ear pain and hearing loss. Negative for ear discharge and tinnitus.    Gastrointestinal: Negative for nausea and vomiting.   Neurological: Negative for dizziness and headaches.          Objective:     /70   Pulse 88   Temp 36.5 °C (97.7 °F)   Resp 14   Ht 1.549 m (5' 1\")   Wt 40.4 kg (89 lb)   SpO2 91%   BMI 16.82 kg/m²      Physical Exam   Constitutional: She is oriented to person, " place, and time. She appears well-developed and well-nourished. No distress.   HENT:   Bilateral EAC's with cerumen impaction.   Cardiovascular: Normal rate, regular rhythm and normal heart sounds.    No murmur heard.  Pulmonary/Chest: Breath sounds normal. No respiratory distress.   Musculoskeletal: Normal range of motion.   Neurological: She is alert and oriented to person, place, and time.   Skin: Skin is warm and dry.   Psychiatric: She has a normal mood and affect. Her behavior is normal. Thought content normal.   Nursing note and vitals reviewed.              Assessment/Plan:     1. Bilateral hearing loss due to cerumen impaction  EAR IRRIGATION (MA ONLY)    REFERRAL TO ENT     At this time we are referring to eNT as she has extensive buildup and she is not tolerating irrigation. Continue ear drops at home.  Differential diagnosis, natural history, supportive care, and indications for immediate follow-up discussed at length.

## 2020-03-12 ENCOUNTER — HOME HEALTH ADMISSION (OUTPATIENT)
Dept: HOME HEALTH SERVICES | Facility: HOME HEALTHCARE | Age: 85
End: 2020-03-12
Payer: COMMERCIAL

## 2020-09-21 ENCOUNTER — HOSPITAL ENCOUNTER (OUTPATIENT)
Dept: RADIOLOGY | Facility: MEDICAL CENTER | Age: 85
End: 2020-09-21
Attending: PHYSICIAN ASSISTANT
Payer: MEDICARE

## 2020-09-21 ENCOUNTER — OFFICE VISIT (OUTPATIENT)
Dept: URGENT CARE | Facility: PHYSICIAN GROUP | Age: 85
End: 2020-09-21
Payer: MEDICARE

## 2020-09-21 VITALS
HEIGHT: 62 IN | SYSTOLIC BLOOD PRESSURE: 112 MMHG | BODY MASS INDEX: 16.56 KG/M2 | OXYGEN SATURATION: 99 % | DIASTOLIC BLOOD PRESSURE: 64 MMHG | HEART RATE: 51 BPM | WEIGHT: 90 LBS | TEMPERATURE: 96.6 F | RESPIRATION RATE: 12 BRPM

## 2020-09-21 DIAGNOSIS — M79.662 PAIN IN LEFT LOWER LEG: ICD-10-CM

## 2020-09-21 DIAGNOSIS — R58 ECCHYMOSIS: ICD-10-CM

## 2020-09-21 DIAGNOSIS — M25.562 ACUTE PAIN OF LEFT KNEE: ICD-10-CM

## 2020-09-21 PROCEDURE — 99214 OFFICE O/P EST MOD 30 MIN: CPT | Performed by: PHYSICIAN ASSISTANT

## 2020-09-21 PROCEDURE — 73562 X-RAY EXAM OF KNEE 3: CPT | Mod: LT

## 2020-09-21 PROCEDURE — 73700 CT LOWER EXTREMITY W/O DYE: CPT | Mod: LT

## 2020-09-21 PROCEDURE — 73590 X-RAY EXAM OF LOWER LEG: CPT | Mod: LT

## 2020-09-21 RX ORDER — HYDROCHLOROTHIAZIDE 25 MG/1
25 TABLET ORAL DAILY
COMMUNITY

## 2020-09-21 RX ORDER — AMIODARONE HYDROCHLORIDE 200 MG/1
200 TABLET ORAL DAILY
COMMUNITY

## 2020-09-21 RX ORDER — TRAMADOL HYDROCHLORIDE 50 MG/1
50 TABLET ORAL EVERY 4 HOURS PRN
Qty: 20 TAB | Refills: 0 | Status: SHIPPED | OUTPATIENT
Start: 2020-09-21 | End: 2020-09-26

## 2020-09-21 RX ORDER — LOSARTAN POTASSIUM 25 MG/1
25 TABLET ORAL DAILY
COMMUNITY

## 2020-09-22 ENCOUNTER — HOSPITAL ENCOUNTER (OUTPATIENT)
Dept: LAB | Facility: MEDICAL CENTER | Age: 85
End: 2020-09-22
Attending: PHYSICIAN ASSISTANT
Payer: MEDICARE

## 2020-09-22 ENCOUNTER — TELEPHONE (OUTPATIENT)
Dept: URGENT CARE | Facility: PHYSICIAN GROUP | Age: 85
End: 2020-09-22

## 2020-09-22 DIAGNOSIS — R58 ECCHYMOSIS: ICD-10-CM

## 2020-09-22 LAB
APTT PPP: 30.6 SEC (ref 24.7–36)
BASOPHILS # BLD AUTO: 0.1 % (ref 0–1.8)
BASOPHILS # BLD: 0.01 K/UL (ref 0–0.12)
EOSINOPHIL # BLD AUTO: 0.01 K/UL (ref 0–0.51)
EOSINOPHIL NFR BLD: 0.1 % (ref 0–6.9)
ERYTHROCYTE [DISTWIDTH] IN BLOOD BY AUTOMATED COUNT: 61.6 FL (ref 35.9–50)
HCT VFR BLD AUTO: 35.1 % (ref 37–47)
HGB BLD-MCNC: 10.6 G/DL (ref 12–16)
IMM GRANULOCYTES # BLD AUTO: 0.02 K/UL (ref 0–0.11)
IMM GRANULOCYTES NFR BLD AUTO: 0.3 % (ref 0–0.9)
INR PPP: 0.89 (ref 0.87–1.13)
LYMPHOCYTES # BLD AUTO: 1.09 K/UL (ref 1–4.8)
LYMPHOCYTES NFR BLD: 16.1 % (ref 22–41)
MCH RBC QN AUTO: 29.5 PG (ref 27–33)
MCHC RBC AUTO-ENTMCNC: 30.2 G/DL (ref 33.6–35)
MCV RBC AUTO: 97.8 FL (ref 81.4–97.8)
MONOCYTES # BLD AUTO: 0.56 K/UL (ref 0–0.85)
MONOCYTES NFR BLD AUTO: 8.2 % (ref 0–13.4)
NEUTROPHILS # BLD AUTO: 5.1 K/UL (ref 2–7.15)
NEUTROPHILS NFR BLD: 75.2 % (ref 44–72)
NRBC # BLD AUTO: 0 K/UL
NRBC BLD-RTO: 0 /100 WBC
PLATELET # BLD AUTO: 230 K/UL (ref 164–446)
PMV BLD AUTO: 10.4 FL (ref 9–12.9)
PROTHROMBIN TIME: 12.3 SEC (ref 12–14.6)
RBC # BLD AUTO: 3.59 M/UL (ref 4.2–5.4)
WBC # BLD AUTO: 6.8 K/UL (ref 4.8–10.8)

## 2020-09-22 PROCEDURE — 85730 THROMBOPLASTIN TIME PARTIAL: CPT | Mod: GA

## 2020-09-22 PROCEDURE — 36415 COLL VENOUS BLD VENIPUNCTURE: CPT

## 2020-09-22 PROCEDURE — 85025 COMPLETE CBC W/AUTO DIFF WBC: CPT

## 2020-09-22 PROCEDURE — 85610 PROTHROMBIN TIME: CPT

## 2020-09-22 ASSESSMENT — ENCOUNTER SYMPTOMS
NUMBNESS: 0
LEG PAIN: 1
WEAKNESS: 0
NAUSEA: 0
FEVER: 0
CHILLS: 0
DIARRHEA: 0
DIZZINESS: 0
VOMITING: 0
ABDOMINAL PAIN: 0
SHORTNESS OF BREATH: 0

## 2020-09-22 NOTE — TELEPHONE ENCOUNTER
Left message for patient's daughter (Awa) informing her of blood work results significant for slight anemia but with normal PT/PTT and normal WBC count. Encouraged to return my call with any questions or concerns.

## 2020-09-22 NOTE — PROGRESS NOTES
"Subjective:      Summer Franco is a 89 y.o. female who presents with Leg Pain (L lower leg [id number] )        Patient is accompanied by her daughter, who is helping as historian throughout exam.     Leg Pain  This is a new problem. The current episode started 1 to 4 weeks ago (1 month). The problem occurs constantly. The problem has been gradually worsening. Pertinent negatives include no abdominal pain, chest pain, chills, congestion, fever, nausea, numbness, rash, vomiting or weakness. Nothing aggravates the symptoms. She has tried nothing for the symptoms.     Patient's daughter states she has been reporting left knee and left lower leg pain for the past month, gradually worsening in severity. She is unsure if the patient had a fall prior to symptoms starting. Bruising over the latera knee and calf area has gradually worsened over the past 5 days with worsening pain. Patient was seen at [facility] and had bilateral US of the lower extremities 3 weeks ago, which was negative for DVTs.  No recent surgeries, prolonged periods of immobilization, history of smoking, or history of blood clots. She does have mild swelling in both lower legs. She has been taking OTC nsaids and tylenol for the leg pain without significant improvement. She does not take any blood thinners.       Review of Systems   Constitutional: Negative for chills and fever.   HENT: Negative for congestion.    Respiratory: Negative for shortness of breath.    Cardiovascular: Negative for chest pain.   Gastrointestinal: Negative for abdominal pain, diarrhea, nausea and vomiting.   Genitourinary: Negative.    Musculoskeletal:        + leg pain   Skin: Negative for rash.   Neurological: Negative for dizziness, weakness and numbness.        Objective:     /64   Pulse (!) 51   Temp 35.9 °C (96.6 °F) (Temporal)   Resp 12   Ht 1.575 m (5' 2\")   Wt 40.8 kg (90 lb)   SpO2 99%   BMI 16.46 kg/m²      Physical Exam  Vitals signs and " nursing note reviewed.   Constitutional:       General: She is not in acute distress.     Appearance: Normal appearance. She is well-developed. She is not diaphoretic.   HENT:      Head: Normocephalic and atraumatic.      Right Ear: External ear normal.      Left Ear: External ear normal.      Nose: Nose normal.   Eyes:      Pupils: Pupils are equal, round, and reactive to light.   Neck:      Musculoskeletal: Normal range of motion.   Cardiovascular:      Rate and Rhythm: Normal rate and regular rhythm.   Pulmonary:      Effort: Pulmonary effort is normal.   Musculoskeletal:      Left knee: She exhibits decreased range of motion and ecchymosis. She exhibits no swelling, no effusion, no LCL laxity and no MCL laxity. Tenderness found. Lateral joint line tenderness noted. No medial joint line tenderness noted.        Legs:       Comments: Large area of ecchymosis localized to left lateral knee and proximal calf. + TTP    1+ pitting edema of lower extremities bilaterally    Skin:     General: Skin is warm and dry.   Neurological:      Mental Status: She is alert and oriented to person, place, and time.   Psychiatric:         Behavior: Behavior normal.            PMH:  has a past medical history of Asthma.  MEDS:   Current Outpatient Medications:   •  amiodarone (CORDARONE) 200 MG Tab, Take 200 mg by mouth every day., Disp: , Rfl:   •  losartan (COZAAR) 25 MG Tab, Take 25 mg by mouth every day., Disp: , Rfl:   •  hydroCHLOROthiazide (HYDRODIURIL) 25 MG Tab, Take 25 mg by mouth every day., Disp: , Rfl:   •  metoprolol (LOPRESSOR) 25 MG Tab, Take 25 mg by mouth 2 times a day., Disp: , Rfl:   •  tramadol (ULTRAM) 50 MG Tab, Take 1 Tab by mouth every four hours as needed for up to 5 days., Disp: 20 Tab, Rfl: 0  •  VESICARE 5 MG tablet, Take 5 mg by mouth every day., Disp: , Rfl: 3  •  PROAIR  (90 Base) MCG/ACT Aero Soln inhalation aerosol, INHALE 2 PUFFS BY MOUTH EVERY 6 HOURS AS NEEDED FOR FOR SHORTNESS OF BREATH  (Patient not taking: Reported on 9/21/2020), Disp: 1 Inhaler, Rfl: 3  •  Multiple Vitamin (MULTI VITAMIN PO), Take  by mouth., Disp: , Rfl:   •  omeprazole (PRILOSEC) 20 MG delayed-release capsule, Take 20 mg by mouth every day., Disp: , Rfl:   •  Ginkgo Biloba 40 MG Tab, Take 60 mg by mouth., Disp: , Rfl:   ALLERGIES: No Known Allergies  SURGHX:   Past Surgical History:   Procedure Laterality Date   • ABDOMINAL HYSTERECTOMY TOTAL       SOCHX:  reports that she has never smoked. She has never used smokeless tobacco. She reports that she does not drink alcohol or use drugs.  FH: family history is not on file.       Assessment/Plan:        1. Acute pain of left knee    - DX-KNEE 3 VIEWS LEFT; Future  IMPRESSION:     Lucency through the medial tibial plateau where a nondisplaced fracture is not excluded. Further evaluation can be performed with CT.     Demineralization which limits evaluation.     Atherosclerotic plaque.    - DX-TIBIA AND FIBULA LEFT; Future  IMPRESSION:     Lucency through the medial tibial plateau where a nondisplaced fracture is not excluded. Further evaluation can be performed with CT.     Demineralization which limits evaluation.     Soft tissue swelling.     Atherosclerotic plaque.          - CT-KNEE W/O LEFT; Future  IMPRESSION:     1.  Negative for fracture     2.  osteoarthritis     3.  Diffuse atherosclerotic plaque     4.  Nonspecific cutaneous and subcutaneous edema      - tramadol (ULTRAM) 50 MG Tab; Take 1 Tab by mouth every four hours as needed for up to 5 days.  Dispense: 20 Tab; Refill: 0   - Will cause sedation, avoid driving, operating heavy machinery, and drinking alcohol  - Consent for Opiate Prescription  - REFERRAL TO ORTHOPEDICS    2. Ecchymosis    - PT AND PTT  - CBC WITH DIFFERENTIAL; Future    Encouraged icing 2-3 times daily, rest, and elevation of left leg. Encouraged use of OTC tylenol or ibuprofen as needed for mild-moderate pain. Tramadol provided for patient to take as  needed for severe pain. She will follow up with orthopedics for further evaluation and management. ED precautions discussed. The patient and her daughter demonstrated a good understanding and agreed with the treatment plan.

## 2020-11-27 ENCOUNTER — HOSPITAL ENCOUNTER (EMERGENCY)
Facility: MEDICAL CENTER | Age: 85
End: 2020-11-27
Attending: EMERGENCY MEDICINE
Payer: MEDICARE

## 2020-11-27 ENCOUNTER — APPOINTMENT (OUTPATIENT)
Dept: RADIOLOGY | Facility: MEDICAL CENTER | Age: 85
End: 2020-11-27
Attending: EMERGENCY MEDICINE
Payer: MEDICARE

## 2020-11-27 VITALS
OXYGEN SATURATION: 90 % | TEMPERATURE: 97.6 F | WEIGHT: 90 LBS | DIASTOLIC BLOOD PRESSURE: 74 MMHG | BODY MASS INDEX: 16.56 KG/M2 | RESPIRATION RATE: 13 BRPM | HEART RATE: 51 BPM | SYSTOLIC BLOOD PRESSURE: 167 MMHG | HEIGHT: 62 IN

## 2020-11-27 DIAGNOSIS — R29.6 FREQUENT FALLS: ICD-10-CM

## 2020-11-27 DIAGNOSIS — S09.90XA CLOSED HEAD INJURY, INITIAL ENCOUNTER: ICD-10-CM
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
## 2020-11-27 LAB
ALBUMIN SERPL BCP-MCNC: 2.5 G/DL (ref 3.2–4.9)
ALBUMIN/GLOB SERPL: 1 G/DL
ALP SERPL-CCNC: 60 U/L (ref 30–99)
ALT SERPL-CCNC: 10 U/L (ref 2–50)
ANION GAP SERPL CALC-SCNC: 4 MMOL/L (ref 7–16)
ANISOCYTOSIS BLD QL SMEAR: ABNORMAL
APPEARANCE UR: ABNORMAL
APTT PPP: 27.5 SEC (ref 24.7–36)
AST SERPL-CCNC: 12 U/L (ref 12–45)
BACTERIA #/AREA URNS HPF: NEGATIVE /HPF
BASOPHILS # BLD AUTO: 0 % (ref 0–1.8)
BASOPHILS # BLD: 0 K/UL (ref 0–0.12)
BILIRUB SERPL-MCNC: 0.2 MG/DL (ref 0.1–1.5)
BILIRUB UR QL STRIP.AUTO: NEGATIVE
BUN SERPL-MCNC: 13 MG/DL (ref 8–22)
CALCIUM SERPL-MCNC: 6.6 MG/DL (ref 8.5–10.5)
CHLORIDE SERPL-SCNC: 109 MMOL/L (ref 96–112)
CO2 SERPL-SCNC: 30 MMOL/L (ref 20–33)
COLOR UR: YELLOW
COMMENT 1642: NORMAL
COVID ORDER STATUS COVID19: NORMAL
CREAT SERPL-MCNC: 0.36 MG/DL (ref 0.5–1.4)
EKG IMPRESSION: NORMAL
EOSINOPHIL # BLD AUTO: 0.01 K/UL (ref 0–0.51)
EOSINOPHIL NFR BLD: 0.1 % (ref 0–6.9)
EPI CELLS #/AREA URNS HPF: ABNORMAL /HPF
ERYTHROCYTE [DISTWIDTH] IN BLOOD BY AUTOMATED COUNT: 51.8 FL (ref 35.9–50)
FLUAV RNA SPEC QL NAA+PROBE: NEGATIVE
FLUBV RNA SPEC QL NAA+PROBE: NEGATIVE
GLOBULIN SER CALC-MCNC: 2.6 G/DL (ref 1.9–3.5)
GLUCOSE SERPL-MCNC: 92 MG/DL (ref 65–99)
GLUCOSE UR STRIP.AUTO-MCNC: NEGATIVE MG/DL
HCT VFR BLD AUTO: 38.5 % (ref 37–47)
HGB BLD-MCNC: 11.4 G/DL (ref 12–16)
HYALINE CASTS #/AREA URNS LPF: ABNORMAL /LPF
HYPOCHROMIA BLD QL SMEAR: ABNORMAL
IMM GRANULOCYTES # BLD AUTO: 0.03 K/UL (ref 0–0.11)
IMM GRANULOCYTES NFR BLD AUTO: 0.4 % (ref 0–0.9)
INR PPP: 1.05 (ref 0.87–1.13)
KETONES UR STRIP.AUTO-MCNC: NEGATIVE MG/DL
LEUKOCYTE ESTERASE UR QL STRIP.AUTO: ABNORMAL
LYMPHOCYTES # BLD AUTO: 1.26 K/UL (ref 1–4.8)
LYMPHOCYTES NFR BLD: 17.5 % (ref 22–41)
MACROCYTES BLD QL SMEAR: ABNORMAL
MAGNESIUM SERPL-MCNC: 1.6 MG/DL (ref 1.5–2.5)
MCH RBC QN AUTO: 27.8 PG (ref 27–33)
MCHC RBC AUTO-ENTMCNC: 29.6 G/DL (ref 33.6–35)
MCV RBC AUTO: 93.9 FL (ref 81.4–97.8)
MICRO URNS: ABNORMAL
MONOCYTES # BLD AUTO: 0.71 K/UL (ref 0–0.85)
MONOCYTES NFR BLD AUTO: 9.8 % (ref 0–13.4)
MORPHOLOGY BLD-IMP: NORMAL
NEUTROPHILS # BLD AUTO: 5.2 K/UL (ref 2–7.15)
NEUTROPHILS NFR BLD: 72.2 % (ref 44–72)
NITRITE UR QL STRIP.AUTO: NEGATIVE
NRBC # BLD AUTO: 0 K/UL
NRBC BLD-RTO: 0 /100 WBC
PH UR STRIP.AUTO: 6 [PH] (ref 5–8)
PLATELET # BLD AUTO: 170 K/UL (ref 164–446)
PLATELET BLD QL SMEAR: NORMAL
PMV BLD AUTO: 10.4 FL (ref 9–12.9)
POTASSIUM SERPL-SCNC: 2.8 MMOL/L (ref 3.6–5.5)
PROT SERPL-MCNC: 5.1 G/DL (ref 6–8.2)
PROT UR QL STRIP: 30 MG/DL
PROTHROMBIN TIME: 14 SEC (ref 12–14.6)
RBC # BLD AUTO: 4.1 M/UL (ref 4.2–5.4)
RBC # URNS HPF: ABNORMAL /HPF
RBC BLD AUTO: PRESENT
RBC UR QL AUTO: NEGATIVE
RSV RNA SPEC QL NAA+PROBE: NEGATIVE
SARS-COV-2 RNA RESP QL NAA+PROBE: NOTDETECTED
SODIUM SERPL-SCNC: 143 MMOL/L (ref 135–145)
SP GR UR STRIP.AUTO: 1.02
SPECIMEN SOURCE: NORMAL
TROPONIN T SERPL-MCNC: 15 NG/L (ref 6–19)
UROBILINOGEN UR STRIP.AUTO-MCNC: 1 MG/DL
WBC # BLD AUTO: 7.2 K/UL (ref 4.8–10.8)
WBC #/AREA URNS HPF: ABNORMAL /HPF

## 2020-11-27 PROCEDURE — 36415 COLL VENOUS BLD VENIPUNCTURE: CPT

## 2020-11-27 PROCEDURE — 85610 PROTHROMBIN TIME: CPT

## 2020-11-27 PROCEDURE — A9270 NON-COVERED ITEM OR SERVICE: HCPCS | Performed by: EMERGENCY MEDICINE

## 2020-11-27 PROCEDURE — 85025 COMPLETE CBC W/AUTO DIFF WBC: CPT

## 2020-11-27 PROCEDURE — 73030 X-RAY EXAM OF SHOULDER: CPT | Mod: LT

## 2020-11-27 PROCEDURE — 81001 URINALYSIS AUTO W/SCOPE: CPT

## 2020-11-27 PROCEDURE — 700105 HCHG RX REV CODE 258: Performed by: EMERGENCY MEDICINE

## 2020-11-27 PROCEDURE — 85730 THROMBOPLASTIN TIME PARTIAL: CPT

## 2020-11-27 PROCEDURE — 700111 HCHG RX REV CODE 636 W/ 250 OVERRIDE (IP): Performed by: EMERGENCY MEDICINE

## 2020-11-27 PROCEDURE — 0241U HCHG SARS-COV-2 COVID-19 NFCT DS RESP RNA 4 TRGT MIC: CPT

## 2020-11-27 PROCEDURE — 96374 THER/PROPH/DIAG INJ IV PUSH: CPT

## 2020-11-27 PROCEDURE — 84484 ASSAY OF TROPONIN QUANT: CPT

## 2020-11-27 PROCEDURE — 83735 ASSAY OF MAGNESIUM: CPT

## 2020-11-27 PROCEDURE — 70450 CT HEAD/BRAIN W/O DYE: CPT

## 2020-11-27 PROCEDURE — 99285 EMERGENCY DEPT VISIT HI MDM: CPT

## 2020-11-27 PROCEDURE — 80053 COMPREHEN METABOLIC PANEL: CPT

## 2020-11-27 PROCEDURE — 72125 CT NECK SPINE W/O DYE: CPT

## 2020-11-27 PROCEDURE — 71045 X-RAY EXAM CHEST 1 VIEW: CPT

## 2020-11-27 PROCEDURE — 700102 HCHG RX REV CODE 250 W/ 637 OVERRIDE(OP): Performed by: EMERGENCY MEDICINE

## 2020-11-27 PROCEDURE — 93005 ELECTROCARDIOGRAM TRACING: CPT | Performed by: EMERGENCY MEDICINE

## 2020-11-27 RX ORDER — CALCIUM GLUCONATE 94 MG/ML
1 INJECTION, SOLUTION INTRAVENOUS ONCE
Status: DISCONTINUED | OUTPATIENT
Start: 2020-11-27 | End: 2020-11-27

## 2020-11-27 RX ORDER — POTASSIUM CHLORIDE 20 MEQ/1
20 TABLET, EXTENDED RELEASE ORAL ONCE
Status: COMPLETED | OUTPATIENT
Start: 2020-11-27 | End: 2020-11-27

## 2020-11-27 RX ADMIN — POTASSIUM CHLORIDE 20 MEQ: 1500 TABLET, EXTENDED RELEASE ORAL at 17:16

## 2020-11-27 RX ADMIN — CALCIUM GLUCONATE 1 G: 98 INJECTION, SOLUTION INTRAVENOUS at 17:16

## 2020-11-27 NOTE — ED TRIAGE NOTES
Pt brought in by ems, pt had a fall getting out of the bed, per ems pt has been falling more frequently, pt c/o pain on her left shoulder, blood sugar 86, pt not on blood thinners, last fall two weeks ago, ems gave pt zofran and fentanyl 50x2, no loc,

## 2020-11-27 NOTE — ED PROVIDER NOTES
ED Provider Note    CHIEF COMPLAINT  Chief Complaint   Patient presents with   • T-5000 FALL       HPI  Summer Franco is a 89 y.o. female who presents following a ground-level fall today.  Unfortunately, over the past several months she has had increasing frequency of falls at home.  No known loss of consciousness though she did strike the left side of her head on the ground.  Patient lives with family and appears to have very extended support system at home.  She does typically ambulate with a walker.  Has some slight left shoulder pain as result of her fall.  No vomiting.  No chest pain or trouble breathing.  I did speak with the patient's granddaughter who is at bedside states that the family has been doing her best to care for her in the home.  The patient is followed closely by a primary care physician.  She has been eating and drinking well at home.  No fevers or recent illness.    REVIEW OF SYSTEMS  See HPI for further details. All other systems are negative.     PAST MEDICAL HISTORY   has a past medical history of Asthma.    SOCIAL HISTORY  Social History     Tobacco Use   • Smoking status: Never Smoker   • Smokeless tobacco: Never Used   Substance and Sexual Activity   • Alcohol use: No   • Drug use: No   • Sexual activity: Not on file       SURGICAL HISTORY   has a past surgical history that includes abdominal hysterectomy total.    CURRENT MEDICATIONS  Home Medications     Reviewed by Lance Bullock R.N. (Registered Nurse) on 11/27/20 at 1415  Med List Status: <None>   Medication Last Dose Status   amiodarone (CORDARONE) 200 MG Tab  Active   Ginkgo Biloba 40 MG Tab  Active   hydroCHLOROthiazide (HYDRODIURIL) 25 MG Tab  Active   losartan (COZAAR) 25 MG Tab  Active   metoprolol (LOPRESSOR) 25 MG Tab  Active   Multiple Vitamin (MULTI VITAMIN PO)  Active   omeprazole (PRILOSEC) 20 MG delayed-release capsule  Active   PROAIR  (90 Base) MCG/ACT Aero Soln inhalation aerosol  Active   VESICARE 5 MG  "tablet  Active                ALLERGIES  No Known Allergies    PHYSICAL EXAM  VITAL SIGNS: BP (!) 164/75   Pulse (!) 55   Temp 36.4 °C (97.6 °F) (Temporal)   Resp (!) 11   Ht 1.575 m (5' 2\")   Wt 40.8 kg (90 lb)   SpO2 100%   BMI 16.46 kg/m²   Pulse ox interpretation: I interpret this pulse ox as normal.  Constitutional: Alert in no apparent distress.  HENT: Abrasion to the left parietal scalp, Bilateral external ears normal, Nose normal.   Eyes: Pupils are equal and reactive, Conjunctiva normal, Non-icteric.   Neck: Normal range of motion, No tenderness, Supple, No stridor.   Lymphatic: No lymphadenopathy noted.   Cardiovascular: Regular rate and rhythm.   Thorax & Lungs: Normal breath sounds, No respiratory distress, No wheezing, No chest tenderness.   Abdomen: Bowel sounds normal, Soft, No tenderness, No masses, No pulsatile masses. No peritoneal signs.  Skin: Warm, Dry, No erythema, No rash.   Back: No bony tenderness, No CVA tenderness.   Extremities: Intact distal pulses, No edema, No tenderness, No cyanosis  Musculoskeletal: Limited range of motion to the left shoulder secondary to pain. No major deformities noted.   Neurologic: Alert, Normal motor function, Normal sensory function, No focal deficits noted.   Psychiatric: Affect normal, Judgment normal, Mood normal.       DIAGNOSTIC STUDIES / PROCEDURES    EKG - Physician interpretation  Results for orders placed or performed during the hospital encounter of 20   EKG   Result Value Ref Range    Report       [facility] Emergency Dept.    Test Date:  2020  Pt Name:    YOHANA ENCARNACION                Department: ER  MRN:        [id number]                      Room:        19  Gender:     Female                       Technician: [id number]  :        [id number]                   Requested By:REGINE VIVEROS  Order #:    [id number]                    Reading MD: REGINE VIVEROS MD    Measurements  Intervals                                " Axis  Rate:       52                           P:          -4  SD:         168                          QRS:        34  QRSD:       80                           T:          59  QT:         500  QTc:        465    Interpretive Statements  SINUS BRADYCARDIA  CONSIDER LEFT VENTRICULAR HYPERTROPHY  BASELINE WANDER IN LEAD(S) V1  No previous ECG available for comparison  Electronically Signed On 11- 17:48:13 PST by REGINE VIVEROS MD           LABS  Labs Reviewed   CBC WITH DIFFERENTIAL - Abnormal; Notable for the following components:       Result Value    RBC 4.10 (*)     Hemoglobin 11.4 (*)     MCHC 29.6 (*)     RDW 51.8 (*)     Neutrophils-Polys 72.20 (*)     Lymphocytes 17.50 (*)     All other components within normal limits    Narrative:     Indicate which anticoagulants the patient is on:->NONE   COMP METABOLIC PANEL - Abnormal; Notable for the following components:    Potassium 2.8 (*)     Anion Gap 4.0 (*)     Creatinine 0.36 (*)     Calcium 6.6 (*)     Albumin 2.5 (*)     Total Protein 5.1 (*)     All other components within normal limits    Narrative:     Indicate which anticoagulants the patient is on:->NONE   URINALYSIS - Abnormal; Notable for the following components:    Character Cloudy (*)     Protein 30 (*)     Leukocyte Esterase Small (*)     All other components within normal limits   URINE MICROSCOPIC (W/UA) - Abnormal; Notable for the following components:    WBC 10-20 (*)     Epithelial Cells Moderate (*)     Hyaline Cast 3-5 (*)     All other components within normal limits   PROTHROMBIN TIME    Narrative:     Indicate which anticoagulants the patient is on:->NONE   APTT    Narrative:     Indicate which anticoagulants the patient is on:->NONE   TROPONIN    Narrative:     Indicate which anticoagulants the patient is on:->NONE   COVID/SARS COV-2    Narrative:     Is patient being admitted?->Yes  Does this patient meet criteria for Rush/Cepheid per [facility]  Inpatient Workflow? (See workflow link  below)->Yes  Expected turn around time?->Rush (Cepheid 2-4 hours)  Have you been in close contact with a person who is suspected  or known to be positive for COVID-19 within the last 30 days  (e.g. last seen that person < 30 days ago)->Unknown   ESTIMATED GFR    Narrative:     Indicate which anticoagulants the patient is on:->NONE   MAGNESIUM   PERIPHERAL SMEAR REVIEW    Narrative:     Indicate which anticoagulants the patient is on:->NONE   PLATELET ESTIMATE    Narrative:     Indicate which anticoagulants the patient is on:->NONE   MORPHOLOGY    Narrative:     Indicate which anticoagulants the patient is on:->NONE   DIFFERENTIAL COMMENT    Narrative:     Indicate which anticoagulants the patient is on:->NONE   COV-2, FLU A/B, AND RSV BY PCR    Narrative:     Is patient being admitted?->Yes  Does this patient meet criteria for Rush/Cepheid per [facility]  Inpatient Workflow? (See workflow link below)->Yes  Expected turn around time?->Rush (Cepheid 2-4 hours)  Have you been in close contact with a person who is suspected  or known to be positive for COVID-19 within the last 30 days  (e.g. last seen that person < 30 days ago)->Unknown         RADIOLOGY  DX-SHOULDER 2+ LEFT   Final Result         1. No acute osseous abnormality.      DX-CHEST-PORTABLE (1 VIEW)   Final Result      1.  There is no acute cardiopulmonary process.   2.  There is an enlarged cardiac silhouette with atherosclerosis.      CT-HEAD W/O   Final Result      1.  Cerebral atrophy.      2.  White matter lucencies most consistent with small vessel ischemic change versus demyelination or gliosis.      3.  Otherwise, Head CT without contrast with no acute findings. No evidence of acute cerebral infarction, hemorrhage or mass lesion.      CT-CSPINE WITHOUT PLUS RECONS   Final Result      No CT evidence of acute cervical spine abnormality.      Moderate spondylosis with degenerative C4/5/6 retrolisthesis      Left 2.3 cm thyroid gland measures which could be  better characterized by ultrasound if clinically indicated            COURSE & MEDICAL DECISION MAKING    Medications   potassium chloride SA (Kdur) tablet 20 mEq (20 mEq Oral Given 11/27/20 1716)   calcium GLUConate 1 g in  mL IVPB (1 g Intravenous Given 11/27/20 1716)       Pertinent Labs & Imaging studies reviewed. (See chart for details)  89 y.o. female presenting after a fall in the home.  Has an abrasion to the left parietal scalp without significant laceration.  She is not on chronic anticoagulation.  No vomiting.  Patient is alert and awake and appears to be at her baseline according to her granddaughter at bedside.  CT of the head and neck were performed that were unremarkable for acute injury requiring further care at this time.  She also had x-ray of her left shoulder performed due to pain there following a fall.  No obvious bony deformity.  Has pain with range of motion.  No evidence of fractures.  Likely soft tissue injury versus contusion.    The patient has increasing frequency of falls though no reported history of syncopal event or loss of consciousness.  She will likely need increased observation and assistance at home.    Covid testing is negative.  This does not account for the patient's increased weakness.  No significant anemia.  She does have some electrolyte abnormalities such as hypokalemia for which she was given potassium supplementation.  She also has hypocalcemia.  It is listed as critical however when correcting for low albumin/protein, it is only slightly low.  She was given calcium supplementation as well.  I encouraged the family to monitor the patient's oral intake and to encourage intake of nutrition.  I do not believe that the patient's electrolyte abnormalities and themselves contributed to the patient's increasing weakness and falls.  Likely has deconditioning given her advanced age and I suspect that she has decreased nutrition intake.  Family does note that they do  "supplement her meals with protein shakes daily (such as Boost/Ensure).  Encouraged to discuss further supplementing the patient's diet with the primary care physician.    I do not see a reason to admit the patient to the hospital for further management especially given the pandemic and multiple Covid positive patients here in the emergency department and in the hospital.  She has great social support at home.  She may benefit from an at home caregiver/home health and encouraged to follow-up with primary care physician regarding this possibility.    The patient appears stable for discharge at this time.    The patient was instructed to follow-up with primary care physician for further management.  To return immediately for any worsening symptoms or development of any other concerning signs or symptoms. The patient verbalizes understanding in their own words.    BP (!) 167/74   Pulse (!) 51   Temp 36.4 °C (97.6 °F) (Temporal)   Resp 13   Ht 1.575 m (5' 2\")   Wt 40.8 kg (90 lb)   SpO2 90%   BMI 16.46 kg/m²     The patient was referred to primary care where they will receive further BP management.      Vilma Sims M.D.  [address] [facility] [id number] [phone]    Schedule an appointment as soon as possible for a visit       [facility], Emergency Dept  [address] [place] [id number] [phone]    As needed, If symptoms worsen      FINAL IMPRESSION  1. Fall, initial encounter    2. Closed head injury, initial encounter    3. Frequent falls            Electronically signed by: Jose R Lowery M.D., 11/27/2020 2:22 PM    "

## 2020-11-28 NOTE — ED NOTES
Discharged paperwork given to pt, all questions answered, granddaughter at bedside, pt wheeled out on a chair to her car in front of the ER

## 2020-11-28 NOTE — ED NOTES
Lab called with critical result of Ca 6.6 at 1622. Critical lab result read back to lab.   Dr. Lowery notified of critical lab result at 1622.  Critical lab result read back by Dr. Lowery.

## 2021-01-15 DIAGNOSIS — Z23 NEED FOR VACCINATION: ICD-10-CM

## 2022-11-26 ENCOUNTER — OFFICE VISIT (OUTPATIENT)
Dept: URGENT CARE | Facility: CLINIC | Age: 87
End: 2022-11-26
Payer: MEDICARE

## 2022-11-26 ENCOUNTER — HOSPITAL ENCOUNTER (OUTPATIENT)
Facility: MEDICAL CENTER | Age: 87
End: 2022-11-26
Attending: PHYSICIAN ASSISTANT
Payer: MEDICARE

## 2022-11-26 VITALS
OXYGEN SATURATION: 93 % | DIASTOLIC BLOOD PRESSURE: 74 MMHG | HEIGHT: 56 IN | RESPIRATION RATE: 20 BRPM | BODY MASS INDEX: 18.13 KG/M2 | TEMPERATURE: 97.6 F | SYSTOLIC BLOOD PRESSURE: 132 MMHG | HEART RATE: 83 BPM | WEIGHT: 80.6 LBS

## 2022-11-26 DIAGNOSIS — T14.8XXA BLISTER OF SKIN: ICD-10-CM

## 2022-11-26 DIAGNOSIS — L29.9 ITCHING: ICD-10-CM

## 2022-11-26 PROCEDURE — 87205 SMEAR GRAM STAIN: CPT

## 2022-11-26 PROCEDURE — 99213 OFFICE O/P EST LOW 20 MIN: CPT | Performed by: PHYSICIAN ASSISTANT

## 2022-11-26 PROCEDURE — 87070 CULTURE OTHR SPECIMN AEROBIC: CPT

## 2022-11-26 RX ORDER — TRIAMCINOLONE ACETONIDE 1 MG/G
1 OINTMENT TOPICAL 2 TIMES DAILY
Qty: 15 G | Refills: 0 | Status: SHIPPED | OUTPATIENT
Start: 2022-11-26 | End: 2022-12-03

## 2022-11-26 ASSESSMENT — ENCOUNTER SYMPTOMS
SORE THROAT: 0
DIARRHEA: 0
CONSTIPATION: 0
SHORTNESS OF BREATH: 0
EYE PAIN: 0
FEVER: 0
CHILLS: 0
VOMITING: 0
COUGH: 0
HEADACHES: 0
MYALGIAS: 0
ABDOMINAL PAIN: 0
NAUSEA: 0

## 2022-11-26 ASSESSMENT — FIBROSIS 4 INDEX: FIB4 SCORE: 2.05

## 2022-11-27 DIAGNOSIS — T14.8XXA BLISTER OF SKIN: ICD-10-CM

## 2022-11-27 LAB
GRAM STN SPEC: NORMAL
SIGNIFICANT IND 70042: NORMAL
SITE SITE: NORMAL
SOURCE SOURCE: NORMAL

## 2022-11-27 NOTE — PROGRESS NOTES
"Subjective:   Summer Franco is a 89 y.o. female who presents for Cyst (Pt has a bump on (L) leg, painful x last night  )    89-year-old female brought in by her daughter for blister on left lower leg which started yesterday and increased in size today.  The patient's been itching that area.  No known injury or trauma    Review of Systems   Constitutional:  Negative for chills and fever.   HENT:  Negative for congestion, ear pain and sore throat.    Eyes:  Negative for pain.   Respiratory:  Negative for cough and shortness of breath.    Cardiovascular:  Negative for chest pain.   Gastrointestinal:  Negative for abdominal pain, constipation, diarrhea, nausea and vomiting.   Genitourinary:  Negative for dysuria.   Musculoskeletal:  Negative for myalgias.   Skin:  Positive for itching. Negative for rash.   Neurological:  Negative for headaches.     Medications, Allergies, and current problem list reviewed today in Epic.     Objective:     /74 (BP Location: Right arm, Patient Position: Sitting, BP Cuff Size: Small adult)   Pulse 83   Temp 36.4 °C (97.6 °F) (Temporal)   Resp 20   Ht 1.43 m (4' 8.3\")   Wt 36.6 kg (80 lb 9.6 oz)   SpO2 93%     Physical Exam  Vitals reviewed.   Constitutional:       Appearance: Normal appearance.   HENT:      Head: Normocephalic and atraumatic.      Right Ear: External ear normal.      Left Ear: External ear normal.      Nose: Nose normal.      Mouth/Throat:      Mouth: Mucous membranes are moist.   Eyes:      Conjunctiva/sclera: Conjunctivae normal.   Cardiovascular:      Rate and Rhythm: Normal rate.   Pulmonary:      Effort: Pulmonary effort is normal.   Skin:     General: Skin is warm and dry.      Capillary Refill: Capillary refill takes less than 2 seconds.      Comments: 4 cm x 4 cm serous fluid bulla left lower leg laterally.  Brawny skin changes   Neurological:      Mental Status: She is alert and oriented to person, place, and time.       Assessment/Plan: "     Diagnosis and associated orders:     1. Blister of skin  CULTURE WOUND W/ GRAM STAIN      2. Itching  triamcinolone acetonide (KENALOG) 0.1 % Ointment         Comments/MDM:     Unclear why there is a large bullous lesion.  No surrounding lesions.  Mild dermatitis and brawny skin changes can consistent with her venous stasis.  We will culture wound.  Steroid cream sent to the pharmacy as she likely cause some sort of inflammation or reaction from itching the wound.  Follow-up as needed.         Differential diagnosis, natural history, supportive care, and indications for immediate follow-up discussed.    Advised the patient to follow-up with the primary care physician for recheck, reevaluation, and consideration of further management.    Please note that this dictation was created using voice recognition software. I have made a reasonable attempt to correct obvious errors, but I expect that there are errors of grammar and possibly content that I did not discover before finalizing the note.    This note was electronically signed by Francisco Fonseca PA-C

## 2022-12-23 ENCOUNTER — OFFICE VISIT (OUTPATIENT)
Dept: URGENT CARE | Facility: PHYSICIAN GROUP | Age: 87
End: 2022-12-23
Payer: MEDICARE

## 2022-12-23 ENCOUNTER — HOSPITAL ENCOUNTER (OUTPATIENT)
Facility: MEDICAL CENTER | Age: 87
End: 2022-12-23
Attending: STUDENT IN AN ORGANIZED HEALTH CARE EDUCATION/TRAINING PROGRAM
Payer: MEDICARE

## 2022-12-23 VITALS
OXYGEN SATURATION: 92 % | SYSTOLIC BLOOD PRESSURE: 118 MMHG | HEIGHT: 56 IN | TEMPERATURE: 97.2 F | DIASTOLIC BLOOD PRESSURE: 72 MMHG | WEIGHT: 80 LBS | HEART RATE: 64 BPM | RESPIRATION RATE: 16 BRPM | BODY MASS INDEX: 18 KG/M2

## 2022-12-23 DIAGNOSIS — Z22.322 MRSA (METHICILLIN RESISTANT STAPH AUREUS) CULTURE POSITIVE: ICD-10-CM

## 2022-12-23 DIAGNOSIS — T14.90XD HEALING WOUND: ICD-10-CM

## 2022-12-23 DIAGNOSIS — Z51.89 VISIT FOR WOUND CHECK: ICD-10-CM

## 2022-12-23 PROCEDURE — 87205 SMEAR GRAM STAIN: CPT

## 2022-12-23 PROCEDURE — 87077 CULTURE AEROBIC IDENTIFY: CPT

## 2022-12-23 PROCEDURE — 99213 OFFICE O/P EST LOW 20 MIN: CPT | Performed by: STUDENT IN AN ORGANIZED HEALTH CARE EDUCATION/TRAINING PROGRAM

## 2022-12-23 PROCEDURE — 87070 CULTURE OTHR SPECIMN AEROBIC: CPT

## 2022-12-23 PROCEDURE — 87186 SC STD MICRODIL/AGAR DIL: CPT

## 2022-12-23 RX ORDER — ACETAMINOPHEN 160 MG/5ML
500 SUSPENSION ORAL EVERY 6 HOURS PRN
Qty: 355 ML | Refills: 0 | Status: SHIPPED | OUTPATIENT
Start: 2022-12-23

## 2022-12-24 DIAGNOSIS — Z51.89 VISIT FOR WOUND CHECK: ICD-10-CM

## 2022-12-24 DIAGNOSIS — T14.90XD HEALING WOUND: ICD-10-CM

## 2022-12-24 NOTE — PROGRESS NOTES
Subjective:   Summer Franco is a 89 y.o. female who presents for Wound Check (Started as 1 inch blister on Friday, Saturday it grew, went November 26 to UC, drained, seeing wound care doctor, unable to get follow up, in a lot of pain at night, unable to swallow pills, noticed bubbles on leg, getting worse, leg edema, severe pain, )      HPI:  Pleasant 89-year-old female presents urgent care for a wound check.  She was originally seen in the urgent care on 11/26/2022 and had a large blister at that time.  Wound culture was conducted which showed no signs of infection.  She is currently being followed by wound care.  She was last seen by wound care on Friday of last week.  She is scheduled to see them again in approximately 2 to 3 weeks.  She reports that her wound has been healing but she is having pain at this time.  She states that she has been unable to take ibuprofen or Tylenol due to difficulty with taking pills.  They have unfortunately not been able to find the liquid suspension due to the stores currently being out.  She denies fever, chills, purulent drainage from the wound, numbness, tingling, burning, radiation of pain up or down the leg, lower leg swelling, nausea, vomiting, abdominal pain, diarrhea, dizziness, chest pain, shortness of breath, or headache.      Medications:    acetaminophen Susp  amiodarone Tabs  Ginkgo Biloba Tabs  hydroCHLOROthiazide Tabs  ibuprofen Susp  losartan Tabs  metoprolol tartrate Tabs  MULTI VITAMIN PO  omeprazole  ProAir HFA Aers  VESIcare Tabs    Allergies: Patient has no known allergies.    Problem List: Summer Franco does not have any pertinent problems on file.    Surgical History:  Past Surgical History:   Procedure Laterality Date    ABDOMINAL HYSTERECTOMY TOTAL         Past Social Hx: Summer Franco  reports that she has never smoked. She has never used smokeless tobacco. She reports that she does not drink alcohol and does not use drugs.     Past Family Hx:  "Summer Franco family history is not on file.     Problem list, medications, and allergies reviewed by myself today in Epic.     Objective:     /72   Pulse 64   Temp 36.2 °C (97.2 °F) (Temporal)   Resp 16   Ht 1.422 m (4' 8\")   Wt 36.3 kg (80 lb)   SpO2 92%   BMI 17.94 kg/m²     Physical Exam  Vitals reviewed.   Constitutional:       General: She is not in acute distress.     Appearance: Normal appearance.   HENT:      Mouth/Throat:      Mouth: Mucous membranes are moist.   Eyes:      Conjunctiva/sclera: Conjunctivae normal.      Pupils: Pupils are equal, round, and reactive to light.   Cardiovascular:      Rate and Rhythm: Normal rate and regular rhythm.      Pulses: Normal pulses.      Heart sounds: Normal heart sounds. No murmur heard.  Pulmonary:      Effort: Pulmonary effort is normal. No respiratory distress.      Breath sounds: Normal breath sounds. No stridor. No wheezing, rhonchi or rales.   Skin:     General: Skin is warm and dry.      Capillary Refill: Capillary refill takes less than 2 seconds.      Comments: 5 cm x 5 cm circumferential wound present to the left lower leg.  Clear serous fluid present.  No induration, fluctuance, or purulent discharge.  No surrounding erythema.  Clean wound edges.  Sensation intact and cap refill less than 2 seconds.  2+ pedal pulse.  No bleeding.  No proximal streaking.   Neurological:      General: No focal deficit present.      Mental Status: She is alert and oriented to person, place, and time.       Assessment/Plan:     Diagnosis and associated orders:     1. Healing wound  CULTURE WOUND W/ GRAM STAIN    acetaminophen (TYLENOL) 160 MG/5ML Suspension    ibuprofen (MOTRIN) 100 MG/5ML Suspension      2. Visit for wound check  CULTURE WOUND W/ GRAM STAIN      3. MRSA (methicillin resistant staph aureus) culture positive  doxycycline (VIBRAMYCIN) 100 MG Tab         Comments/MDM:     Patient's presentation physical exam findings are consistent with a " healing wound.  I do not see any signs of active infection at this time.  We will do a wound culture for further evaluation.  Patient's main concern is her pain which she has not been able to control at home.  Unfortunately she has been having difficulty with taking pills and cannot find liquid suspensions of Tylenol and ibuprofen to take at home.  We will do prescription for both these medications in the hopes that the pharmacy has extra supply that she may use.  We will avoid Toradol at this time in clinic due to her age and contraindications.  Patient is agreeable to this.  Continue with current wound care as advised by the wound clinic.  Patient will be contacted with any positive result on wound culture that requires antibiotic management.  Vitals all within normal limits.  No signs of sepsis.  ED/return precautions were given.         Differential diagnosis, natural history, supportive care, and indications for immediate follow-up discussed.    Advised the patient to follow-up with the primary care physician for recheck, reevaluation, and consideration of further management.    Please note that this dictation was created using voice recognition software. I have made a reasonable attempt to correct obvious errors, but I expect that there are errors of grammar and possibly content that I did not discover before finalizing the note.    Electronically signed by Yang Yu PA-C.

## 2022-12-25 LAB
GRAM STN SPEC: NORMAL
SIGNIFICANT IND 70042: NORMAL
SITE SITE: NORMAL
SOURCE SOURCE: NORMAL

## 2022-12-26 RX ORDER — DOXYCYCLINE HYCLATE 100 MG
100 TABLET ORAL 2 TIMES DAILY
Qty: 20 TABLET | Refills: 0 | Status: SHIPPED | OUTPATIENT
Start: 2022-12-26 | End: 2023-01-05

## 2023-11-29 ENCOUNTER — PATIENT MESSAGE (OUTPATIENT)
Dept: HEALTH INFORMATION MANAGEMENT | Facility: OTHER | Age: 88
End: 2023-11-29